# Patient Record
Sex: FEMALE | NOT HISPANIC OR LATINO | Employment: OTHER | URBAN - METROPOLITAN AREA
[De-identification: names, ages, dates, MRNs, and addresses within clinical notes are randomized per-mention and may not be internally consistent; named-entity substitution may affect disease eponyms.]

---

## 2017-02-16 ENCOUNTER — HOSPITAL ENCOUNTER (INPATIENT)
Facility: HOSPITAL | Age: 77
LOS: 22 days | Discharge: NURSING, REHAB OR LONG-TERM CARE | End: 2017-03-10
Attending: SPECIALIST | Admitting: SPECIALIST

## 2017-02-16 ENCOUNTER — HOSPITAL ENCOUNTER (EMERGENCY)
Facility: HOSPITAL | Age: 77
Discharge: PSYCHIATRIC OR SUBSTANCE USE FACILITY | End: 2017-02-16
Attending: EMERGENCY MEDICINE | Admitting: EMERGENCY MEDICINE

## 2017-02-16 VITALS
DIASTOLIC BLOOD PRESSURE: 63 MMHG | RESPIRATION RATE: 16 BRPM | BODY MASS INDEX: 42.68 KG/M2 | HEIGHT: 64 IN | OXYGEN SATURATION: 97 % | TEMPERATURE: 98.6 F | SYSTOLIC BLOOD PRESSURE: 132 MMHG | HEART RATE: 59 BPM | WEIGHT: 250 LBS

## 2017-02-16 DIAGNOSIS — F03.91 DEMENTIA WITH BEHAVIORAL DISTURBANCE, UNSPECIFIED DEMENTIA TYPE: Primary | ICD-10-CM

## 2017-02-16 PROBLEM — F03.918 DEMENTIA WITH BEHAVIORAL DISTURBANCE (HCC): Status: ACTIVE | Noted: 2017-02-16

## 2017-02-16 LAB
AMPHET+METHAMPHET UR QL: NEGATIVE
ANION GAP SERPL CALCULATED.3IONS-SCNC: 10.6 MMOL/L
BARBITURATES UR QL SCN: POSITIVE
BASOPHILS # BLD AUTO: 0.02 10*3/MM3 (ref 0–0.2)
BASOPHILS NFR BLD AUTO: 0.3 % (ref 0–1.5)
BENZODIAZ UR QL SCN: NEGATIVE
BILIRUB UR QL STRIP: NEGATIVE
BUN BLD-MCNC: 12 MG/DL (ref 8–23)
BUN/CREAT SERPL: 10.3 (ref 7–25)
CALCIUM SPEC-SCNC: 8.8 MG/DL (ref 8.6–10.5)
CANNABINOIDS SERPL QL: NEGATIVE
CHLORIDE SERPL-SCNC: 105 MMOL/L (ref 98–107)
CLARITY UR: CLEAR
CO2 SERPL-SCNC: 23.4 MMOL/L (ref 22–29)
COCAINE UR QL: NEGATIVE
COLOR UR: YELLOW
CREAT BLD-MCNC: 1.16 MG/DL (ref 0.57–1)
DEPRECATED RDW RBC AUTO: 59.8 FL (ref 37–54)
EOSINOPHIL # BLD AUTO: 0.21 10*3/MM3 (ref 0–0.7)
EOSINOPHIL NFR BLD AUTO: 3.1 % (ref 0.3–6.2)
ERYTHROCYTE [DISTWIDTH] IN BLOOD BY AUTOMATED COUNT: 17.3 % (ref 11.7–13)
ETHANOL BLD-MCNC: <10 MG/DL (ref 0–10)
ETHANOL UR QL: <0.01 %
GFR SERPL CREATININE-BSD FRML MDRD: 55 ML/MIN/1.73
GLUCOSE BLD-MCNC: 92 MG/DL (ref 65–99)
GLUCOSE UR STRIP-MCNC: NEGATIVE MG/DL
HCT VFR BLD AUTO: 34.9 % (ref 35.6–45.5)
HGB BLD-MCNC: 10.7 G/DL (ref 11.9–15.5)
HGB UR QL STRIP.AUTO: NEGATIVE
IMM GRANULOCYTES # BLD: 0.03 10*3/MM3 (ref 0–0.03)
IMM GRANULOCYTES NFR BLD: 0.4 % (ref 0–0.5)
KETONES UR QL STRIP: NEGATIVE
LEUKOCYTE ESTERASE UR QL STRIP.AUTO: NEGATIVE
LYMPHOCYTES # BLD AUTO: 1.8 10*3/MM3 (ref 0.9–4.8)
LYMPHOCYTES NFR BLD AUTO: 26.9 % (ref 19.6–45.3)
MCH RBC QN AUTO: 29.1 PG (ref 26.9–32)
MCHC RBC AUTO-ENTMCNC: 30.7 G/DL (ref 32.4–36.3)
MCV RBC AUTO: 94.8 FL (ref 80.5–98.2)
METHADONE UR QL SCN: NEGATIVE
MONOCYTES # BLD AUTO: 0.75 10*3/MM3 (ref 0.2–1.2)
MONOCYTES NFR BLD AUTO: 11.2 % (ref 5–12)
NEUTROPHILS # BLD AUTO: 3.88 10*3/MM3 (ref 1.9–8.1)
NEUTROPHILS NFR BLD AUTO: 58.1 % (ref 42.7–76)
NITRITE UR QL STRIP: NEGATIVE
OPIATES UR QL: NEGATIVE
OXYCODONE UR QL SCN: NEGATIVE
PH UR STRIP.AUTO: 6 [PH] (ref 5–8)
PLATELET # BLD AUTO: 226 10*3/MM3 (ref 140–500)
PMV BLD AUTO: 9.7 FL (ref 6–12)
POTASSIUM BLD-SCNC: 4.3 MMOL/L (ref 3.5–5.2)
PROT UR QL STRIP: NEGATIVE
RBC # BLD AUTO: 3.68 10*6/MM3 (ref 3.9–5.2)
SODIUM BLD-SCNC: 139 MMOL/L (ref 136–145)
SP GR UR STRIP: 1.01 (ref 1–1.03)
UROBILINOGEN UR QL STRIP: NORMAL
WBC NRBC COR # BLD: 6.69 10*3/MM3 (ref 4.5–10.7)

## 2017-02-16 PROCEDURE — 25010000002 ZIPRASIDONE MESYLATE PER 10 MG: Performed by: SPECIALIST

## 2017-02-16 PROCEDURE — 25010000002 LORAZEPAM PER 2 MG: Performed by: SPECIALIST

## 2017-02-16 RX ORDER — HYDROXYZINE PAMOATE 25 MG/1
25 CAPSULE ORAL EVERY 6 HOURS PRN
Status: COMPLETED | OUTPATIENT
Start: 2017-02-16 | End: 2017-02-21

## 2017-02-16 RX ORDER — MEMANTINE HYDROCHLORIDE 10 MG/1
10 TABLET ORAL ONCE
Status: DISCONTINUED | OUTPATIENT
Start: 2017-02-16 | End: 2017-02-17

## 2017-02-16 RX ORDER — MEMANTINE HYDROCHLORIDE 10 MG/1
10 TABLET ORAL 2 TIMES DAILY
COMMUNITY

## 2017-02-16 RX ORDER — LORAZEPAM 2 MG/ML
INJECTION INTRAMUSCULAR
Status: DISCONTINUED
Start: 2017-02-16 | End: 2017-02-16 | Stop reason: WASHOUT

## 2017-02-16 RX ORDER — CLONAZEPAM 0.5 MG/1
0.5 TABLET ORAL ONCE
Status: DISCONTINUED | OUTPATIENT
Start: 2017-02-16 | End: 2017-02-18

## 2017-02-16 RX ORDER — ATORVASTATIN CALCIUM 10 MG/1
10 TABLET, FILM COATED ORAL ONCE
Status: DISCONTINUED | OUTPATIENT
Start: 2017-02-16 | End: 2017-02-18

## 2017-02-16 RX ORDER — LORAZEPAM 2 MG/ML
2 INJECTION INTRAMUSCULAR ONCE
Status: COMPLETED | OUTPATIENT
Start: 2017-02-16 | End: 2017-02-16

## 2017-02-16 RX ORDER — PRIMIDONE 250 MG/1
500 TABLET ORAL ONCE
Status: DISCONTINUED | OUTPATIENT
Start: 2017-02-16 | End: 2017-02-17

## 2017-02-16 RX ORDER — PRIMIDONE 250 MG/1
250 TABLET ORAL 3 TIMES DAILY
COMMUNITY
End: 2017-03-10 | Stop reason: HOSPADM

## 2017-02-16 RX ORDER — HYDROXYZINE PAMOATE 25 MG/1
25 CAPSULE ORAL 3 TIMES DAILY PRN
COMMUNITY

## 2017-02-16 RX ORDER — CITALOPRAM 20 MG/1
20 TABLET ORAL DAILY
COMMUNITY

## 2017-02-16 RX ORDER — LORAZEPAM 2 MG/ML
1 INJECTION INTRAMUSCULAR ONCE
Status: COMPLETED | OUTPATIENT
Start: 2017-02-16 | End: 2017-02-16

## 2017-02-16 RX ORDER — ZIPRASIDONE MESYLATE 20 MG/ML
10 INJECTION, POWDER, LYOPHILIZED, FOR SOLUTION INTRAMUSCULAR ONCE
Status: COMPLETED | OUTPATIENT
Start: 2017-02-16 | End: 2017-02-16

## 2017-02-16 RX ORDER — METOPROLOL SUCCINATE 25 MG/1
25 TABLET, EXTENDED RELEASE ORAL DAILY
COMMUNITY

## 2017-02-16 RX ORDER — CLONAZEPAM 0.5 MG/1
0.25 TABLET ORAL 2 TIMES DAILY PRN
COMMUNITY
End: 2017-03-10 | Stop reason: HOSPADM

## 2017-02-16 RX ORDER — DONEPEZIL HYDROCHLORIDE 10 MG/1
10 TABLET, FILM COATED ORAL ONCE
Status: DISCONTINUED | OUTPATIENT
Start: 2017-02-16 | End: 2017-02-18

## 2017-02-16 RX ORDER — DONEPEZIL HYDROCHLORIDE 10 MG/1
10 TABLET, FILM COATED ORAL NIGHTLY
COMMUNITY

## 2017-02-16 RX ADMIN — ZIPRASIDONE MESYLATE 10 MG: 20 INJECTION, POWDER, LYOPHILIZED, FOR SOLUTION INTRAMUSCULAR at 16:33

## 2017-02-16 RX ADMIN — ZIPRASIDONE MESYLATE 10 MG: 20 INJECTION, POWDER, LYOPHILIZED, FOR SOLUTION INTRAMUSCULAR at 21:24

## 2017-02-16 RX ADMIN — LORAZEPAM 1 MG: 2 INJECTION, SOLUTION INTRAMUSCULAR; INTRAVENOUS at 20:04

## 2017-02-16 RX ADMIN — LORAZEPAM 2 MG: 2 INJECTION, SOLUTION INTRAMUSCULAR; INTRAVENOUS at 21:25

## 2017-02-17 PROCEDURE — 25810000003 SODIUM CHLORIDE 0.9 % WITH KCL 20 MEQ 20-0.9 MEQ/L-% SOLUTION: Performed by: HOSPITALIST

## 2017-02-17 PROCEDURE — 25010000002 LORAZEPAM PER 2 MG: Performed by: SPECIALIST

## 2017-02-17 PROCEDURE — 25010000002 ZIPRASIDONE MESYLATE PER 10 MG: Performed by: SPECIALIST

## 2017-02-17 RX ORDER — DONEPEZIL HYDROCHLORIDE 10 MG/1
10 TABLET, FILM COATED ORAL NIGHTLY
Status: DISCONTINUED | OUTPATIENT
Start: 2017-02-17 | End: 2017-03-10 | Stop reason: HOSPADM

## 2017-02-17 RX ORDER — ZIPRASIDONE MESYLATE 20 MG/ML
20 INJECTION, POWDER, LYOPHILIZED, FOR SOLUTION INTRAMUSCULAR ONCE
Status: COMPLETED | OUTPATIENT
Start: 2017-02-17 | End: 2017-02-17

## 2017-02-17 RX ORDER — PANTOPRAZOLE SODIUM 40 MG/1
40 TABLET, DELAYED RELEASE ORAL
Status: DISCONTINUED | OUTPATIENT
Start: 2017-02-18 | End: 2017-03-10 | Stop reason: HOSPADM

## 2017-02-17 RX ORDER — CLONAZEPAM 0.5 MG/1
0.25 TABLET ORAL 2 TIMES DAILY PRN
Status: DISCONTINUED | OUTPATIENT
Start: 2017-02-17 | End: 2017-02-17

## 2017-02-17 RX ORDER — DIVALPROEX SODIUM 125 MG/1
250 CAPSULE, COATED PELLETS ORAL EVERY 6 HOURS SCHEDULED
Status: DISCONTINUED | OUTPATIENT
Start: 2017-02-17 | End: 2017-02-18

## 2017-02-17 RX ORDER — ZIPRASIDONE MESYLATE 20 MG/ML
20 INJECTION, POWDER, LYOPHILIZED, FOR SOLUTION INTRAMUSCULAR EVERY 6 HOURS PRN
Status: DISCONTINUED | OUTPATIENT
Start: 2017-02-17 | End: 2017-03-10 | Stop reason: HOSPADM

## 2017-02-17 RX ORDER — SODIUM CHLORIDE AND POTASSIUM CHLORIDE 150; 900 MG/100ML; MG/100ML
75 INJECTION, SOLUTION INTRAVENOUS CONTINUOUS
Status: DISCONTINUED | OUTPATIENT
Start: 2017-02-17 | End: 2017-02-18

## 2017-02-17 RX ORDER — PRIMIDONE 250 MG/1
250 TABLET ORAL 3 TIMES DAILY
Status: DISCONTINUED | OUTPATIENT
Start: 2017-02-17 | End: 2017-02-18

## 2017-02-17 RX ORDER — MEMANTINE HYDROCHLORIDE 10 MG/1
10 TABLET ORAL 2 TIMES DAILY
Status: DISCONTINUED | OUTPATIENT
Start: 2017-02-17 | End: 2017-03-10 | Stop reason: HOSPADM

## 2017-02-17 RX ORDER — LORAZEPAM 2 MG/ML
2 INJECTION INTRAMUSCULAR ONCE
Status: COMPLETED | OUTPATIENT
Start: 2017-02-17 | End: 2017-02-17

## 2017-02-17 RX ORDER — METOPROLOL SUCCINATE 25 MG/1
25 TABLET, EXTENDED RELEASE ORAL DAILY
Status: DISCONTINUED | OUTPATIENT
Start: 2017-02-17 | End: 2017-02-21

## 2017-02-17 RX ADMIN — ZIPRASIDONE MESYLATE 20 MG: 20 INJECTION, POWDER, LYOPHILIZED, FOR SOLUTION INTRAMUSCULAR at 09:37

## 2017-02-17 RX ADMIN — LORAZEPAM 2 MG: 2 INJECTION, SOLUTION INTRAMUSCULAR; INTRAVENOUS at 09:37

## 2017-02-17 RX ADMIN — ZIPRASIDONE MESYLATE 20 MG: 20 INJECTION, POWDER, LYOPHILIZED, FOR SOLUTION INTRAMUSCULAR at 16:36

## 2017-02-17 RX ADMIN — POTASSIUM CHLORIDE AND SODIUM CHLORIDE 75 ML/HR: 900; 150 INJECTION, SOLUTION INTRAVENOUS at 20:20

## 2017-02-17 RX ADMIN — DIVALPROEX SODIUM 250 MG: 125 CAPSULE, COATED PELLETS ORAL at 19:50

## 2017-02-17 RX ADMIN — PRIMIDONE 250 MG: 250 TABLET ORAL at 20:20

## 2017-02-17 RX ADMIN — MEMANTINE HYDROCHLORIDE 10 MG: 10 TABLET, FILM COATED ORAL at 19:55

## 2017-02-17 RX ADMIN — DONEPEZIL HYDROCHLORIDE 10 MG: 10 TABLET, FILM COATED ORAL at 20:20

## 2017-02-18 LAB
ANION GAP SERPL CALCULATED.3IONS-SCNC: 15.1 MMOL/L
BUN BLD-MCNC: 7 MG/DL (ref 8–23)
BUN/CREAT SERPL: 8.3 (ref 7–25)
CALCIUM SPEC-SCNC: 9.3 MG/DL (ref 8.6–10.5)
CHLORIDE SERPL-SCNC: 102 MMOL/L (ref 98–107)
CHOLEST SERPL-MCNC: 227 MG/DL (ref 0–200)
CO2 SERPL-SCNC: 19.9 MMOL/L (ref 22–29)
CREAT BLD-MCNC: 0.84 MG/DL (ref 0.57–1)
GFR SERPL CREATININE-BSD FRML MDRD: 80 ML/MIN/1.73
GLUCOSE BLD-MCNC: 102 MG/DL (ref 65–99)
HBA1C MFR BLD: 5.31 % (ref 4.8–5.6)
HDLC SERPL-MCNC: 120 MG/DL (ref 40–60)
LDLC SERPL CALC-MCNC: 91 MG/DL (ref 0–100)
LDLC/HDLC SERPL: 0.76 {RATIO}
NT-PROBNP SERPL-MCNC: 528.3 PG/ML (ref 5–900)
POTASSIUM BLD-SCNC: 4.5 MMOL/L (ref 3.5–5.2)
SODIUM BLD-SCNC: 137 MMOL/L (ref 136–145)
TRIGL SERPL-MCNC: 82 MG/DL (ref 0–150)
TSH SERPL DL<=0.05 MIU/L-ACNC: 1.6 MIU/ML (ref 0.27–4.2)
VLDLC SERPL-MCNC: 16.4 MG/DL (ref 5–40)

## 2017-02-18 PROCEDURE — 80048 BASIC METABOLIC PNL TOTAL CA: CPT | Performed by: HOSPITALIST

## 2017-02-18 PROCEDURE — 80061 LIPID PANEL: CPT | Performed by: HOSPITALIST

## 2017-02-18 PROCEDURE — 84443 ASSAY THYROID STIM HORMONE: CPT | Performed by: HOSPITALIST

## 2017-02-18 PROCEDURE — 83880 ASSAY OF NATRIURETIC PEPTIDE: CPT | Performed by: HOSPITALIST

## 2017-02-18 PROCEDURE — 83036 HEMOGLOBIN GLYCOSYLATED A1C: CPT | Performed by: HOSPITALIST

## 2017-02-18 RX ORDER — DIVALPROEX SODIUM 125 MG/1
250 CAPSULE, COATED PELLETS ORAL 4 TIMES DAILY
Status: DISCONTINUED | OUTPATIENT
Start: 2017-02-18 | End: 2017-03-10 | Stop reason: HOSPADM

## 2017-02-18 RX ORDER — PRIMIDONE 250 MG/1
125 TABLET ORAL DAILY
Status: DISCONTINUED | OUTPATIENT
Start: 2017-02-19 | End: 2017-03-10 | Stop reason: HOSPADM

## 2017-02-18 RX ORDER — ATORVASTATIN CALCIUM 40 MG/1
40 TABLET, FILM COATED ORAL NIGHTLY
Status: DISCONTINUED | OUTPATIENT
Start: 2017-02-18 | End: 2017-03-09

## 2017-02-18 RX ORDER — PRIMIDONE 250 MG/1
500 TABLET ORAL NIGHTLY
Status: DISCONTINUED | OUTPATIENT
Start: 2017-02-18 | End: 2017-03-10 | Stop reason: HOSPADM

## 2017-02-18 RX ADMIN — PRIMIDONE 250 MG: 250 TABLET ORAL at 08:24

## 2017-02-18 RX ADMIN — METOPROLOL SUCCINATE 25 MG: 25 TABLET, FILM COATED, EXTENDED RELEASE ORAL at 08:24

## 2017-02-18 RX ADMIN — DIVALPROEX SODIUM 250 MG: 125 CAPSULE, COATED PELLETS ORAL at 02:01

## 2017-02-18 RX ADMIN — DIVALPROEX SODIUM 250 MG: 125 CAPSULE, COATED PELLETS ORAL at 21:30

## 2017-02-18 RX ADMIN — MEMANTINE HYDROCHLORIDE 10 MG: 10 TABLET, FILM COATED ORAL at 17:45

## 2017-02-18 RX ADMIN — DIVALPROEX SODIUM 250 MG: 125 CAPSULE, COATED PELLETS ORAL at 08:24

## 2017-02-18 RX ADMIN — ATORVASTATIN CALCIUM 40 MG: 40 TABLET, FILM COATED ORAL at 21:30

## 2017-02-18 RX ADMIN — MEMANTINE HYDROCHLORIDE 10 MG: 10 TABLET, FILM COATED ORAL at 08:24

## 2017-02-18 RX ADMIN — DIVALPROEX SODIUM 250 MG: 125 CAPSULE, COATED PELLETS ORAL at 17:45

## 2017-02-18 RX ADMIN — HYDROXYZINE PAMOATE 25 MG: 25 CAPSULE ORAL at 08:26

## 2017-02-18 RX ADMIN — PANTOPRAZOLE SODIUM 40 MG: 40 TABLET, DELAYED RELEASE ORAL at 08:24

## 2017-02-18 RX ADMIN — DONEPEZIL HYDROCHLORIDE 10 MG: 10 TABLET, FILM COATED ORAL at 21:30

## 2017-02-18 RX ADMIN — PRIMIDONE 500 MG: 250 TABLET ORAL at 21:31

## 2017-02-19 PROCEDURE — 97110 THERAPEUTIC EXERCISES: CPT

## 2017-02-19 PROCEDURE — 25010000002 ZIPRASIDONE MESYLATE PER 10 MG: Performed by: SPECIALIST

## 2017-02-19 PROCEDURE — 97162 PT EVAL MOD COMPLEX 30 MIN: CPT

## 2017-02-19 RX ORDER — ACETAMINOPHEN 325 MG/1
650 TABLET ORAL EVERY 6 HOURS PRN
Status: DISCONTINUED | OUTPATIENT
Start: 2017-02-19 | End: 2017-03-10 | Stop reason: HOSPADM

## 2017-02-19 RX ORDER — IBUPROFEN 400 MG/1
400 TABLET ORAL EVERY 6 HOURS PRN
Status: DISCONTINUED | OUTPATIENT
Start: 2017-02-19 | End: 2017-03-10 | Stop reason: HOSPADM

## 2017-02-19 RX ADMIN — METOPROLOL SUCCINATE 25 MG: 25 TABLET, FILM COATED, EXTENDED RELEASE ORAL at 09:19

## 2017-02-19 RX ADMIN — MEMANTINE HYDROCHLORIDE 10 MG: 10 TABLET, FILM COATED ORAL at 09:19

## 2017-02-19 RX ADMIN — PRIMIDONE 125 MG: 250 TABLET ORAL at 09:20

## 2017-02-19 RX ADMIN — PANTOPRAZOLE SODIUM 40 MG: 40 TABLET, DELAYED RELEASE ORAL at 09:19

## 2017-02-19 RX ADMIN — DIVALPROEX SODIUM 250 MG: 125 CAPSULE, COATED PELLETS ORAL at 18:51

## 2017-02-19 RX ADMIN — ZIPRASIDONE MESYLATE 20 MG: 20 INJECTION, POWDER, LYOPHILIZED, FOR SOLUTION INTRAMUSCULAR at 15:34

## 2017-02-19 RX ADMIN — DIVALPROEX SODIUM 250 MG: 125 CAPSULE, COATED PELLETS ORAL at 09:19

## 2017-02-19 RX ADMIN — IBUPROFEN 400 MG: 400 TABLET ORAL at 18:51

## 2017-02-19 RX ADMIN — ACETAMINOPHEN 650 MG: 325 TABLET ORAL at 14:47

## 2017-02-20 PROCEDURE — 97110 THERAPEUTIC EXERCISES: CPT

## 2017-02-20 PROCEDURE — 25010000002 ZIPRASIDONE MESYLATE PER 10 MG: Performed by: SPECIALIST

## 2017-02-20 RX ORDER — ZIPRASIDONE HYDROCHLORIDE 20 MG/1
20 CAPSULE ORAL 2 TIMES DAILY WITH MEALS
Status: DISCONTINUED | OUTPATIENT
Start: 2017-02-20 | End: 2017-03-10 | Stop reason: HOSPADM

## 2017-02-20 RX ADMIN — ZIPRASIDONE MESYLATE 20 MG: 20 INJECTION, POWDER, LYOPHILIZED, FOR SOLUTION INTRAMUSCULAR at 10:40

## 2017-02-20 RX ADMIN — ZIPRASIDONE HYDROCHLORIDE 20 MG: 20 CAPSULE ORAL at 12:59

## 2017-02-20 RX ADMIN — IBUPROFEN 400 MG: 400 TABLET ORAL at 18:51

## 2017-02-20 RX ADMIN — MEMANTINE HYDROCHLORIDE 10 MG: 10 TABLET, FILM COATED ORAL at 18:48

## 2017-02-20 RX ADMIN — METOPROLOL SUCCINATE 25 MG: 25 TABLET, FILM COATED, EXTENDED RELEASE ORAL at 09:18

## 2017-02-20 RX ADMIN — ZIPRASIDONE HYDROCHLORIDE 20 MG: 20 CAPSULE ORAL at 18:48

## 2017-02-20 RX ADMIN — PRIMIDONE 125 MG: 250 TABLET ORAL at 09:19

## 2017-02-20 RX ADMIN — DIVALPROEX SODIUM 250 MG: 125 CAPSULE, COATED PELLETS ORAL at 09:18

## 2017-02-20 RX ADMIN — DIVALPROEX SODIUM 250 MG: 125 CAPSULE, COATED PELLETS ORAL at 12:59

## 2017-02-20 RX ADMIN — MEMANTINE HYDROCHLORIDE 10 MG: 10 TABLET, FILM COATED ORAL at 09:18

## 2017-02-20 RX ADMIN — DIVALPROEX SODIUM 250 MG: 125 CAPSULE, COATED PELLETS ORAL at 18:48

## 2017-02-20 RX ADMIN — IBUPROFEN 400 MG: 400 TABLET ORAL at 05:35

## 2017-02-20 RX ADMIN — PANTOPRAZOLE SODIUM 40 MG: 40 TABLET, DELAYED RELEASE ORAL at 09:18

## 2017-02-21 RX ORDER — METOPROLOL SUCCINATE 50 MG/1
50 TABLET, EXTENDED RELEASE ORAL DAILY
Status: DISCONTINUED | OUTPATIENT
Start: 2017-02-22 | End: 2017-03-10 | Stop reason: HOSPADM

## 2017-02-21 RX ADMIN — PRIMIDONE 125 MG: 250 TABLET ORAL at 09:54

## 2017-02-21 RX ADMIN — DONEPEZIL HYDROCHLORIDE 10 MG: 10 TABLET, FILM COATED ORAL at 01:01

## 2017-02-21 RX ADMIN — DIVALPROEX SODIUM 250 MG: 125 CAPSULE, COATED PELLETS ORAL at 09:54

## 2017-02-21 RX ADMIN — ZIPRASIDONE HYDROCHLORIDE 20 MG: 20 CAPSULE ORAL at 09:55

## 2017-02-21 RX ADMIN — MEMANTINE HYDROCHLORIDE 10 MG: 10 TABLET, FILM COATED ORAL at 17:23

## 2017-02-21 RX ADMIN — METOPROLOL SUCCINATE 25 MG: 25 TABLET, FILM COATED, EXTENDED RELEASE ORAL at 09:55

## 2017-02-21 RX ADMIN — MEMANTINE HYDROCHLORIDE 10 MG: 10 TABLET, FILM COATED ORAL at 09:55

## 2017-02-21 RX ADMIN — PANTOPRAZOLE SODIUM 40 MG: 40 TABLET, DELAYED RELEASE ORAL at 09:55

## 2017-02-21 RX ADMIN — DIVALPROEX SODIUM 250 MG: 125 CAPSULE, COATED PELLETS ORAL at 00:59

## 2017-02-21 RX ADMIN — ZIPRASIDONE HYDROCHLORIDE 20 MG: 20 CAPSULE ORAL at 17:23

## 2017-02-21 RX ADMIN — ATORVASTATIN CALCIUM 40 MG: 40 TABLET, FILM COATED ORAL at 01:00

## 2017-02-21 RX ADMIN — DIVALPROEX SODIUM 250 MG: 125 CAPSULE, COATED PELLETS ORAL at 17:23

## 2017-02-21 RX ADMIN — PRIMIDONE 500 MG: 250 TABLET ORAL at 00:59

## 2017-02-21 RX ADMIN — DIVALPROEX SODIUM 250 MG: 125 CAPSULE, COATED PELLETS ORAL at 13:07

## 2017-02-21 RX ADMIN — HYDROXYZINE PAMOATE 25 MG: 25 CAPSULE ORAL at 02:40

## 2017-02-22 PROCEDURE — 25010000002 ZIPRASIDONE MESYLATE PER 10 MG: Performed by: SPECIALIST

## 2017-02-22 RX ORDER — MAGNESIUM HYDROXIDE/ALUMINUM HYDROXICE/SIMETHICONE 120; 1200; 1200 MG/30ML; MG/30ML; MG/30ML
30 SUSPENSION ORAL EVERY 6 HOURS PRN
Status: DISCONTINUED | OUTPATIENT
Start: 2017-02-22 | End: 2017-03-10 | Stop reason: HOSPADM

## 2017-02-22 RX ADMIN — ZIPRASIDONE MESYLATE 20 MG: 20 INJECTION, POWDER, LYOPHILIZED, FOR SOLUTION INTRAMUSCULAR at 00:08

## 2017-02-22 RX ADMIN — DIVALPROEX SODIUM 250 MG: 125 CAPSULE, COATED PELLETS ORAL at 20:32

## 2017-02-22 RX ADMIN — ALUMINUM HYDROXIDE, MAGNESIUM HYDROXIDE, AND SIMETHICONE 30 ML: 200; 200; 20 SUSPENSION ORAL at 17:13

## 2017-02-22 RX ADMIN — DIVALPROEX SODIUM 250 MG: 125 CAPSULE, COATED PELLETS ORAL at 10:50

## 2017-02-22 RX ADMIN — ATORVASTATIN CALCIUM 40 MG: 40 TABLET, FILM COATED ORAL at 20:32

## 2017-02-22 RX ADMIN — PRIMIDONE 125 MG: 250 TABLET ORAL at 10:50

## 2017-02-22 RX ADMIN — ZIPRASIDONE HYDROCHLORIDE 20 MG: 20 CAPSULE ORAL at 10:50

## 2017-02-22 RX ADMIN — PANTOPRAZOLE SODIUM 40 MG: 40 TABLET, DELAYED RELEASE ORAL at 10:50

## 2017-02-22 RX ADMIN — METOPROLOL SUCCINATE 50 MG: 50 TABLET, FILM COATED, EXTENDED RELEASE ORAL at 10:51

## 2017-02-22 RX ADMIN — DONEPEZIL HYDROCHLORIDE 10 MG: 10 TABLET, FILM COATED ORAL at 20:32

## 2017-02-22 RX ADMIN — MEMANTINE HYDROCHLORIDE 10 MG: 10 TABLET, FILM COATED ORAL at 10:49

## 2017-02-22 RX ADMIN — DIVALPROEX SODIUM 250 MG: 125 CAPSULE, COATED PELLETS ORAL at 14:53

## 2017-02-22 RX ADMIN — PRIMIDONE 500 MG: 250 TABLET ORAL at 20:32

## 2017-02-23 PROCEDURE — 25010000002 ZIPRASIDONE MESYLATE PER 10 MG: Performed by: SPECIALIST

## 2017-02-23 PROCEDURE — 97110 THERAPEUTIC EXERCISES: CPT

## 2017-02-23 RX ADMIN — DONEPEZIL HYDROCHLORIDE 10 MG: 10 TABLET, FILM COATED ORAL at 21:12

## 2017-02-23 RX ADMIN — MEMANTINE HYDROCHLORIDE 10 MG: 10 TABLET, FILM COATED ORAL at 21:13

## 2017-02-23 RX ADMIN — PRIMIDONE 125 MG: 250 TABLET ORAL at 15:57

## 2017-02-23 RX ADMIN — ATORVASTATIN CALCIUM 40 MG: 40 TABLET, FILM COATED ORAL at 21:12

## 2017-02-23 RX ADMIN — DIVALPROEX SODIUM 250 MG: 125 CAPSULE, COATED PELLETS ORAL at 21:12

## 2017-02-23 RX ADMIN — METOPROLOL SUCCINATE 50 MG: 50 TABLET, FILM COATED, EXTENDED RELEASE ORAL at 15:57

## 2017-02-23 RX ADMIN — PRIMIDONE 500 MG: 250 TABLET ORAL at 21:13

## 2017-02-23 RX ADMIN — ZIPRASIDONE MESYLATE 20 MG: 20 INJECTION, POWDER, LYOPHILIZED, FOR SOLUTION INTRAMUSCULAR at 16:01

## 2017-02-24 RX ORDER — HYDROCODONE BITARTRATE AND ACETAMINOPHEN 5; 325 MG/1; MG/1
1 TABLET ORAL EVERY 6 HOURS
Status: DISPENSED | OUTPATIENT
Start: 2017-02-24 | End: 2017-03-06

## 2017-02-24 RX ORDER — HYDROCODONE BITARTRATE AND ACETAMINOPHEN 5; 325 MG/1; MG/1
1 TABLET ORAL EVERY 6 HOURS PRN
Status: DISCONTINUED | OUTPATIENT
Start: 2017-02-24 | End: 2017-02-24

## 2017-02-24 RX ADMIN — PRIMIDONE 500 MG: 250 TABLET ORAL at 21:34

## 2017-02-24 RX ADMIN — DIVALPROEX SODIUM 250 MG: 125 CAPSULE, COATED PELLETS ORAL at 09:21

## 2017-02-24 RX ADMIN — DIVALPROEX SODIUM 250 MG: 125 CAPSULE, COATED PELLETS ORAL at 17:48

## 2017-02-24 RX ADMIN — ZIPRASIDONE HYDROCHLORIDE 20 MG: 20 CAPSULE ORAL at 09:23

## 2017-02-24 RX ADMIN — ATORVASTATIN CALCIUM 40 MG: 40 TABLET, FILM COATED ORAL at 21:34

## 2017-02-24 RX ADMIN — PRIMIDONE 125 MG: 250 TABLET ORAL at 09:21

## 2017-02-24 RX ADMIN — MEMANTINE HYDROCHLORIDE 10 MG: 10 TABLET, FILM COATED ORAL at 17:48

## 2017-02-24 RX ADMIN — ZIPRASIDONE HYDROCHLORIDE 20 MG: 20 CAPSULE ORAL at 17:48

## 2017-02-24 RX ADMIN — HYDROCODONE BITARTATE AND ACETAMINOPHEN 1 TABLET: 5; 325 TABLET ORAL at 23:59

## 2017-02-24 RX ADMIN — DIVALPROEX SODIUM 250 MG: 125 CAPSULE, COATED PELLETS ORAL at 12:49

## 2017-02-24 RX ADMIN — MEMANTINE HYDROCHLORIDE 10 MG: 10 TABLET, FILM COATED ORAL at 09:20

## 2017-02-24 RX ADMIN — DIVALPROEX SODIUM 250 MG: 125 CAPSULE, COATED PELLETS ORAL at 21:34

## 2017-02-24 RX ADMIN — DONEPEZIL HYDROCHLORIDE 10 MG: 10 TABLET, FILM COATED ORAL at 21:33

## 2017-02-24 RX ADMIN — HYDROCODONE BITARTATE AND ACETAMINOPHEN 1 TABLET: 5; 325 TABLET ORAL at 17:47

## 2017-02-24 RX ADMIN — METOPROLOL SUCCINATE 50 MG: 50 TABLET, FILM COATED, EXTENDED RELEASE ORAL at 09:20

## 2017-02-25 RX ADMIN — METOPROLOL SUCCINATE 50 MG: 50 TABLET, FILM COATED, EXTENDED RELEASE ORAL at 09:21

## 2017-02-25 RX ADMIN — MEMANTINE HYDROCHLORIDE 10 MG: 10 TABLET, FILM COATED ORAL at 17:35

## 2017-02-25 RX ADMIN — ZIPRASIDONE HYDROCHLORIDE 20 MG: 20 CAPSULE ORAL at 09:20

## 2017-02-25 RX ADMIN — HYDROCODONE BITARTATE AND ACETAMINOPHEN 1 TABLET: 5; 325 TABLET ORAL at 22:25

## 2017-02-25 RX ADMIN — PANTOPRAZOLE SODIUM 40 MG: 40 TABLET, DELAYED RELEASE ORAL at 09:29

## 2017-02-25 RX ADMIN — DIVALPROEX SODIUM 250 MG: 125 CAPSULE, COATED PELLETS ORAL at 17:35

## 2017-02-25 RX ADMIN — DIVALPROEX SODIUM 250 MG: 125 CAPSULE, COATED PELLETS ORAL at 13:56

## 2017-02-25 RX ADMIN — MEMANTINE HYDROCHLORIDE 10 MG: 10 TABLET, FILM COATED ORAL at 09:22

## 2017-02-25 RX ADMIN — DIVALPROEX SODIUM 250 MG: 125 CAPSULE, COATED PELLETS ORAL at 22:24

## 2017-02-25 RX ADMIN — ATORVASTATIN CALCIUM 40 MG: 40 TABLET, FILM COATED ORAL at 22:25

## 2017-02-25 RX ADMIN — PRIMIDONE 125 MG: 250 TABLET ORAL at 09:37

## 2017-02-25 RX ADMIN — DIVALPROEX SODIUM 250 MG: 125 CAPSULE, COATED PELLETS ORAL at 09:22

## 2017-02-25 RX ADMIN — ZIPRASIDONE HYDROCHLORIDE 20 MG: 20 CAPSULE ORAL at 17:34

## 2017-02-25 RX ADMIN — HYDROCODONE BITARTATE AND ACETAMINOPHEN 1 TABLET: 5; 325 TABLET ORAL at 17:35

## 2017-02-25 RX ADMIN — HYDROCODONE BITARTATE AND ACETAMINOPHEN 1 TABLET: 5; 325 TABLET ORAL at 09:20

## 2017-02-26 LAB
ANION GAP SERPL CALCULATED.3IONS-SCNC: 11.1 MMOL/L
BUN BLD-MCNC: 20 MG/DL (ref 8–23)
BUN/CREAT SERPL: 20.8 (ref 7–25)
CALCIUM SPEC-SCNC: 9.1 MG/DL (ref 8.6–10.5)
CHLORIDE SERPL-SCNC: 105 MMOL/L (ref 98–107)
CO2 SERPL-SCNC: 23.9 MMOL/L (ref 22–29)
CREAT BLD-MCNC: 0.96 MG/DL (ref 0.57–1)
GFR SERPL CREATININE-BSD FRML MDRD: 69 ML/MIN/1.73
GLUCOSE BLD-MCNC: 86 MG/DL (ref 65–99)
POTASSIUM BLD-SCNC: 4.1 MMOL/L (ref 3.5–5.2)
SODIUM BLD-SCNC: 140 MMOL/L (ref 136–145)

## 2017-02-26 PROCEDURE — 80048 BASIC METABOLIC PNL TOTAL CA: CPT | Performed by: HOSPITALIST

## 2017-02-26 RX ADMIN — MEMANTINE HYDROCHLORIDE 10 MG: 10 TABLET, FILM COATED ORAL at 22:53

## 2017-02-26 RX ADMIN — METOPROLOL SUCCINATE 50 MG: 50 TABLET, FILM COATED, EXTENDED RELEASE ORAL at 08:46

## 2017-02-26 RX ADMIN — MEMANTINE HYDROCHLORIDE 10 MG: 10 TABLET, FILM COATED ORAL at 08:45

## 2017-02-26 RX ADMIN — DONEPEZIL HYDROCHLORIDE 10 MG: 10 TABLET, FILM COATED ORAL at 22:53

## 2017-02-26 RX ADMIN — ATORVASTATIN CALCIUM 40 MG: 40 TABLET, FILM COATED ORAL at 22:53

## 2017-02-26 RX ADMIN — DIVALPROEX SODIUM 250 MG: 125 CAPSULE, COATED PELLETS ORAL at 22:55

## 2017-02-26 RX ADMIN — ZIPRASIDONE HYDROCHLORIDE 20 MG: 20 CAPSULE ORAL at 18:25

## 2017-02-26 RX ADMIN — PRIMIDONE 125 MG: 250 TABLET ORAL at 08:45

## 2017-02-26 RX ADMIN — PANTOPRAZOLE SODIUM 40 MG: 40 TABLET, DELAYED RELEASE ORAL at 08:45

## 2017-02-26 RX ADMIN — DIVALPROEX SODIUM 250 MG: 125 CAPSULE, COATED PELLETS ORAL at 08:45

## 2017-02-26 RX ADMIN — DIVALPROEX SODIUM 250 MG: 125 CAPSULE, COATED PELLETS ORAL at 18:24

## 2017-02-26 RX ADMIN — ZIPRASIDONE HYDROCHLORIDE 20 MG: 20 CAPSULE ORAL at 08:46

## 2017-02-26 RX ADMIN — HYDROCODONE BITARTATE AND ACETAMINOPHEN 1 TABLET: 5; 325 TABLET ORAL at 04:11

## 2017-02-26 RX ADMIN — HYDROCODONE BITARTATE AND ACETAMINOPHEN 1 TABLET: 5; 325 TABLET ORAL at 18:25

## 2017-02-26 RX ADMIN — HYDROCODONE BITARTATE AND ACETAMINOPHEN 1 TABLET: 5; 325 TABLET ORAL at 23:06

## 2017-02-26 RX ADMIN — PRIMIDONE 500 MG: 250 TABLET ORAL at 22:54

## 2017-02-27 PROCEDURE — 97110 THERAPEUTIC EXERCISES: CPT

## 2017-02-27 RX ADMIN — DIVALPROEX SODIUM 250 MG: 125 CAPSULE, COATED PELLETS ORAL at 11:18

## 2017-02-27 RX ADMIN — ZIPRASIDONE HYDROCHLORIDE 20 MG: 20 CAPSULE ORAL at 08:32

## 2017-02-27 RX ADMIN — DONEPEZIL HYDROCHLORIDE 10 MG: 10 TABLET, FILM COATED ORAL at 22:35

## 2017-02-27 RX ADMIN — DIVALPROEX SODIUM 250 MG: 125 CAPSULE, COATED PELLETS ORAL at 08:32

## 2017-02-27 RX ADMIN — PRIMIDONE 125 MG: 250 TABLET ORAL at 08:32

## 2017-02-27 RX ADMIN — PRIMIDONE 500 MG: 250 TABLET ORAL at 22:33

## 2017-02-27 RX ADMIN — ZIPRASIDONE HYDROCHLORIDE 20 MG: 20 CAPSULE ORAL at 17:16

## 2017-02-27 RX ADMIN — HYDROCODONE BITARTATE AND ACETAMINOPHEN 1 TABLET: 5; 325 TABLET ORAL at 22:34

## 2017-02-27 RX ADMIN — ATORVASTATIN CALCIUM 40 MG: 40 TABLET, FILM COATED ORAL at 22:33

## 2017-02-27 RX ADMIN — PANTOPRAZOLE SODIUM 40 MG: 40 TABLET, DELAYED RELEASE ORAL at 08:34

## 2017-02-27 RX ADMIN — HYDROCODONE BITARTATE AND ACETAMINOPHEN 1 TABLET: 5; 325 TABLET ORAL at 04:40

## 2017-02-27 RX ADMIN — MEMANTINE HYDROCHLORIDE 10 MG: 10 TABLET, FILM COATED ORAL at 08:32

## 2017-02-27 RX ADMIN — DIVALPROEX SODIUM 250 MG: 125 CAPSULE, COATED PELLETS ORAL at 17:16

## 2017-02-27 RX ADMIN — METOPROLOL SUCCINATE 50 MG: 50 TABLET, FILM COATED, EXTENDED RELEASE ORAL at 08:33

## 2017-02-27 RX ADMIN — HYDROCODONE BITARTATE AND ACETAMINOPHEN 1 TABLET: 5; 325 TABLET ORAL at 16:16

## 2017-02-27 RX ADMIN — DIVALPROEX SODIUM 250 MG: 125 CAPSULE, COATED PELLETS ORAL at 22:33

## 2017-02-27 RX ADMIN — MEMANTINE HYDROCHLORIDE 10 MG: 10 TABLET, FILM COATED ORAL at 17:16

## 2017-02-27 RX ADMIN — HYDROCODONE BITARTATE AND ACETAMINOPHEN 1 TABLET: 5; 325 TABLET ORAL at 10:17

## 2017-02-28 LAB — VIT B12 BLD-MCNC: 561 PG/ML (ref 211–946)

## 2017-02-28 PROCEDURE — 82607 VITAMIN B-12: CPT | Performed by: SPECIALIST

## 2017-02-28 RX ADMIN — PANTOPRAZOLE SODIUM 40 MG: 40 TABLET, DELAYED RELEASE ORAL at 10:08

## 2017-02-28 RX ADMIN — MEMANTINE HYDROCHLORIDE 10 MG: 10 TABLET, FILM COATED ORAL at 17:38

## 2017-02-28 RX ADMIN — HYDROCODONE BITARTATE AND ACETAMINOPHEN 1 TABLET: 5; 325 TABLET ORAL at 23:11

## 2017-02-28 RX ADMIN — PRIMIDONE 125 MG: 250 TABLET ORAL at 10:08

## 2017-02-28 RX ADMIN — DIVALPROEX SODIUM 250 MG: 125 CAPSULE, COATED PELLETS ORAL at 10:08

## 2017-02-28 RX ADMIN — HYDROCODONE BITARTATE AND ACETAMINOPHEN 1 TABLET: 5; 325 TABLET ORAL at 17:38

## 2017-02-28 RX ADMIN — ATORVASTATIN CALCIUM 40 MG: 40 TABLET, FILM COATED ORAL at 23:10

## 2017-02-28 RX ADMIN — DIVALPROEX SODIUM 250 MG: 125 CAPSULE, COATED PELLETS ORAL at 23:10

## 2017-02-28 RX ADMIN — ZIPRASIDONE HYDROCHLORIDE 20 MG: 20 CAPSULE ORAL at 17:38

## 2017-02-28 RX ADMIN — DONEPEZIL HYDROCHLORIDE 10 MG: 10 TABLET, FILM COATED ORAL at 23:09

## 2017-02-28 RX ADMIN — HYDROCODONE BITARTATE AND ACETAMINOPHEN 1 TABLET: 5; 325 TABLET ORAL at 10:10

## 2017-02-28 RX ADMIN — PRIMIDONE 500 MG: 250 TABLET ORAL at 23:11

## 2017-02-28 RX ADMIN — MEMANTINE HYDROCHLORIDE 10 MG: 10 TABLET, FILM COATED ORAL at 10:11

## 2017-02-28 RX ADMIN — DEXTROMETHORPHAN HYDROBROMIDE AND QUINIDINE SULFATE 1 CAPSULE: 20; 10 CAPSULE, GELATIN COATED ORAL at 10:11

## 2017-02-28 RX ADMIN — DIVALPROEX SODIUM 250 MG: 125 CAPSULE, COATED PELLETS ORAL at 17:38

## 2017-02-28 RX ADMIN — METOPROLOL SUCCINATE 50 MG: 50 TABLET, FILM COATED, EXTENDED RELEASE ORAL at 10:09

## 2017-02-28 RX ADMIN — ZIPRASIDONE HYDROCHLORIDE 20 MG: 20 CAPSULE ORAL at 10:10

## 2017-03-01 LAB
ALBUMIN SERPL-MCNC: 3.2 G/DL (ref 3.5–5.2)
ALBUMIN/GLOB SERPL: 0.8 G/DL
ALP SERPL-CCNC: 87 U/L (ref 39–117)
ALT SERPL W P-5'-P-CCNC: 8 U/L (ref 1–33)
ANION GAP SERPL CALCULATED.3IONS-SCNC: 13.6 MMOL/L
AST SERPL-CCNC: 15 U/L (ref 1–32)
BASOPHILS # BLD AUTO: 0.04 10*3/MM3 (ref 0–0.2)
BASOPHILS NFR BLD AUTO: 0.6 % (ref 0–1.5)
BILIRUB SERPL-MCNC: 0.2 MG/DL (ref 0.1–1.2)
BUN BLD-MCNC: 13 MG/DL (ref 8–23)
BUN/CREAT SERPL: 15.3 (ref 7–25)
CALCIUM SPEC-SCNC: 9.8 MG/DL (ref 8.6–10.5)
CHLORIDE SERPL-SCNC: 103 MMOL/L (ref 98–107)
CO2 SERPL-SCNC: 24.4 MMOL/L (ref 22–29)
CREAT BLD-MCNC: 0.85 MG/DL (ref 0.57–1)
DEPRECATED RDW RBC AUTO: 57.8 FL (ref 37–54)
EOSINOPHIL # BLD AUTO: 0.16 10*3/MM3 (ref 0–0.7)
EOSINOPHIL NFR BLD AUTO: 2.2 % (ref 0.3–6.2)
ERYTHROCYTE [DISTWIDTH] IN BLOOD BY AUTOMATED COUNT: 16.9 % (ref 11.7–13)
GFR SERPL CREATININE-BSD FRML MDRD: 79 ML/MIN/1.73
GLOBULIN UR ELPH-MCNC: 4.1 GM/DL
GLUCOSE BLD-MCNC: 87 MG/DL (ref 65–99)
HCT VFR BLD AUTO: 39.7 % (ref 35.6–45.5)
HGB BLD-MCNC: 12.6 G/DL (ref 11.9–15.5)
IMM GRANULOCYTES # BLD: 0.07 10*3/MM3 (ref 0–0.03)
IMM GRANULOCYTES NFR BLD: 1 % (ref 0–0.5)
LYMPHOCYTES # BLD AUTO: 2.3 10*3/MM3 (ref 0.9–4.8)
LYMPHOCYTES NFR BLD AUTO: 31.6 % (ref 19.6–45.3)
MCH RBC QN AUTO: 29.5 PG (ref 26.9–32)
MCHC RBC AUTO-ENTMCNC: 31.7 G/DL (ref 32.4–36.3)
MCV RBC AUTO: 93 FL (ref 80.5–98.2)
MONOCYTES # BLD AUTO: 1.16 10*3/MM3 (ref 0.2–1.2)
MONOCYTES NFR BLD AUTO: 16 % (ref 5–12)
NEUTROPHILS # BLD AUTO: 3.54 10*3/MM3 (ref 1.9–8.1)
NEUTROPHILS NFR BLD AUTO: 48.6 % (ref 42.7–76)
PLATELET # BLD AUTO: 237 10*3/MM3 (ref 140–500)
PMV BLD AUTO: 10.1 FL (ref 6–12)
POTASSIUM BLD-SCNC: 4.5 MMOL/L (ref 3.5–5.2)
PROT SERPL-MCNC: 7.3 G/DL (ref 6–8.5)
RBC # BLD AUTO: 4.27 10*6/MM3 (ref 3.9–5.2)
SODIUM BLD-SCNC: 141 MMOL/L (ref 136–145)
WBC NRBC COR # BLD: 7.27 10*3/MM3 (ref 4.5–10.7)

## 2017-03-01 PROCEDURE — 85025 COMPLETE CBC W/AUTO DIFF WBC: CPT | Performed by: HOSPITALIST

## 2017-03-01 PROCEDURE — 80053 COMPREHEN METABOLIC PANEL: CPT | Performed by: HOSPITALIST

## 2017-03-01 RX ADMIN — HYDROCODONE BITARTATE AND ACETAMINOPHEN 1 TABLET: 5; 325 TABLET ORAL at 12:56

## 2017-03-01 RX ADMIN — PRIMIDONE 500 MG: 250 TABLET ORAL at 20:47

## 2017-03-01 RX ADMIN — DIVALPROEX SODIUM 250 MG: 125 CAPSULE, COATED PELLETS ORAL at 08:31

## 2017-03-01 RX ADMIN — ATORVASTATIN CALCIUM 40 MG: 40 TABLET, FILM COATED ORAL at 20:48

## 2017-03-01 RX ADMIN — MEMANTINE HYDROCHLORIDE 10 MG: 10 TABLET, FILM COATED ORAL at 08:31

## 2017-03-01 RX ADMIN — MEMANTINE HYDROCHLORIDE 10 MG: 10 TABLET, FILM COATED ORAL at 17:00

## 2017-03-01 RX ADMIN — METOPROLOL SUCCINATE 50 MG: 50 TABLET, FILM COATED, EXTENDED RELEASE ORAL at 08:30

## 2017-03-01 RX ADMIN — DIVALPROEX SODIUM 250 MG: 125 CAPSULE, COATED PELLETS ORAL at 17:00

## 2017-03-01 RX ADMIN — ZIPRASIDONE HYDROCHLORIDE 20 MG: 20 CAPSULE ORAL at 08:31

## 2017-03-01 RX ADMIN — DIVALPROEX SODIUM 250 MG: 125 CAPSULE, COATED PELLETS ORAL at 20:47

## 2017-03-01 RX ADMIN — ZIPRASIDONE HYDROCHLORIDE 20 MG: 20 CAPSULE ORAL at 17:01

## 2017-03-01 RX ADMIN — PRIMIDONE 125 MG: 250 TABLET ORAL at 08:31

## 2017-03-01 RX ADMIN — DONEPEZIL HYDROCHLORIDE 10 MG: 10 TABLET, FILM COATED ORAL at 20:48

## 2017-03-01 RX ADMIN — HYDROCODONE BITARTATE AND ACETAMINOPHEN 1 TABLET: 5; 325 TABLET ORAL at 17:01

## 2017-03-01 RX ADMIN — HYDROCODONE BITARTATE AND ACETAMINOPHEN 1 TABLET: 5; 325 TABLET ORAL at 23:36

## 2017-03-01 RX ADMIN — PANTOPRAZOLE SODIUM 40 MG: 40 TABLET, DELAYED RELEASE ORAL at 08:31

## 2017-03-01 RX ADMIN — DEXTROMETHORPHAN HYDROBROMIDE AND QUINIDINE SULFATE 1 CAPSULE: 20; 10 CAPSULE, GELATIN COATED ORAL at 08:31

## 2017-03-01 RX ADMIN — DIVALPROEX SODIUM 250 MG: 125 CAPSULE, COATED PELLETS ORAL at 12:56

## 2017-03-01 NOTE — SIGNIFICANT NOTE
03/01/17 1544   Rehab Treatment   Discipline physical therapy assistant   Treatment Not Performed patient/family declined treatment  (Pnt initially agreed to PT. When attempted to do LE ex, pnt did not participate, closed her eyes. Refused to attempt to sit up on EOB.)

## 2017-03-01 NOTE — PLAN OF CARE
Problem:  Patient Care Overview (Adult)  Goal: Plan of Care Review  Outcome: Ongoing (interventions implemented as appropriate)    02/19/17 1103 02/28/17 2105 03/01/17 0707   Coping/Psychosocial Response Interventions   Plan Of Care Reviewed With --  durable power of ;family --    Coping/Psychosocial   Patient Agreement with Plan of Care --  agrees --    Patient Care Overview   Consent Given to Review Plan with Pt presents w increased generalized weakness as well as impaired cognition limiting pts safety and independence w functional mobility. Due to the above pt will benifit from skilled PT. --  --    Progress --  --  improving   Outcome Evaluation   Outcome Summary/Follow up Plan --  --  Unable to assess anxiety and depression r/t confusion. Refused scheduled Norco at 0415. Pt up in chair in dayroom at 0400. Tolerated well. Continue to monitor and assess.        Goal: Interdisciplinary Rounds/Family Conference  Outcome: Ongoing (interventions implemented as appropriate)  Goal: Individualization and Mutuality  Outcome: Ongoing (interventions implemented as appropriate)  Goal: Discharge Needs Assessment  Outcome: Ongoing (interventions implemented as appropriate)    Problem:  Overarching Goals  Goal: Adheres to Safety Considerations for Self and Others  Outcome: Ongoing (interventions implemented as appropriate)  Intervention: Develop/maintain Individualized Safety Plan    02/27/17 0930 03/01/17 0230   Safety   Safety Measures --  safety rounds completed;suicide assessment completed   Mental Health Homicide Risk   Homicidal Ideation --  no   Willingness to Contact Staff Member if Feeling Like Hurting Others --  other (see comments)  (unable to assess)   Provide Emotional/Physical Safety   Suicidal Ideation --  no   Suicide Plan/Availability unable to assess --    Willingness to Contact Staff Member if Feeling Like Hurting Self --  other (see comments)  (unable to assess)         Goal: Optimized Coping  Skills in Response to Life Stressors  Outcome: Ongoing (interventions implemented as appropriate)  Intervention: Promote Effective Coping Strategies    03/01/17 0230   Coping Strategies   Supportive Measures active listening utilized;self-reflection promoted;self-responsibility promoted;verbalization of feelings encouraged         Goal: Develops/Participates in Therapeutic [place] to Support Successful Transition  Outcome: Ongoing (interventions implemented as appropriate)  Intervention: Foster Therapeutic [place]    03/01/17 0230   Coping Strategies   Trust Relationship/Rapport care explained;choices provided;emotional support provided;empathic listening provided;questions answered;questions encouraged;reassurance provided;thoughts/feelings acknowledged       Intervention: Mutually Develop Transition Plan    02/28/17 1800   OTHER   Transition Support crisis management plan promoted

## 2017-03-01 NOTE — PLAN OF CARE
Problem: [facility] Patient Care Overview (Adult)  Goal: Plan of Care Review  Outcome: Ongoing (interventions implemented as appropriate)    02/28/17 2105   Coping/Psychosocial Response Interventions   Plan Of Care Reviewed With durable power of ;family   Coping/Psychosocial   Patient Agreement with Plan of Care agrees   Patient Care Overview   Progress improving   Outcome Evaluation   Outcome Summary/Follow up Plan PT with less agitation this shift than previous. Accepted all meds. Allowed bed bath. Less verbal abuse to staff also. Over all more cooperative. Will con't to monitor.        Goal: Interdisciplinary Rounds/Family Conference  Outcome: Ongoing (interventions implemented as appropriate)  Goal: Individualization and Mutuality  Outcome: Ongoing (interventions implemented as appropriate)  Goal: Discharge Needs Assessment  Outcome: Ongoing (interventions implemented as appropriate)    Problem:  Overarching Goals  Goal: Adheres to Safety Considerations for Self and Others  Outcome: Ongoing (interventions implemented as appropriate)  Goal: Optimized Coping Skills in Response to Life Stressors  Outcome: Ongoing (interventions implemented as appropriate)  Goal: Develops/Participates in Therapeutic [place] to Support Successful Transition  Outcome: Ongoing (interventions implemented as appropriate)

## 2017-03-01 NOTE — PROGRESS NOTES
DAILY PROGRESS NOTE    CHIEF COMPLAINT  DOING BETTER  NO NEW ISSUES      HISTORY OF PRESENT ILLNESS: A 76-year-old  female with a history of dementia, hypertension, admitted through the emergency room to the psychiatric unit with increased agitation and combativeness. The patient is also having auditory and visual hallucinations. I am asked to follow the patient for medical problems and do the history and physical.     PHYSICAL EXAMINATION:Blood pressure 162/79, pulse 59, temperature 97.6 °F (36.4 °C), temperature source Oral, resp. rate 20, weight 252 lb (114 kg), SpO2 98 %.    GENERAL: Alert, combative, in no respiratory distress.   HEENT: Unremarkable.    NECK: Supple.   LUNGS: Decreased breath sounds.   HEART: S1 and S2.    ABDOMEN: Soft, nontender. Bowel sounds positive.   EXTREMITIES: No edema.    NEUROLOGIC: Moves all 4 extremities. Gait and balance not checked.     DIAGNOSTIC DATA:   Lab Results (last 24 hours)     Procedure Component Value Units Date/Time    Comprehensive Metabolic Panel [[id number]] Collected:  03/01/17 1153    Specimen:  Blood Updated:  03/01/17 1234    CBC & Differential [[id number]] Collected:  03/01/17 1153    Specimen:  Blood Updated:  03/01/17 1240    Narrative:       The following orders were created for panel order CBC & Differential.  Procedure                               Abnormality         Status                     ---------                               -----------         ------                     CBC Auto Differential[[id number]]         Abnormal            Final result                 Please view results for these tests on the individual orders.    CBC Auto Differential [[id number]]  (Abnormal) Collected:  03/01/17 1153    Specimen:  Blood Updated:  03/01/17 1240     WBC 7.27 10*3/mm3      RBC 4.27 10*6/mm3      Hemoglobin 12.6 g/dL      Hematocrit 39.7 %      MCV 93.0 fL      MCH 29.5 pg      MCHC 31.7 (L) g/dL      RDW 16.9 (H) %      RDW-SD 57.8 (H) fl       MPV 10.1 fL      Platelets 237 10*3/mm3      Neutrophil % 48.6 %      Lymphocyte % 31.6 %      Monocyte % 16.0 (H) %      Eosinophil % 2.2 %      Basophil % 0.6 %      Immature Grans % 1.0 (H) %      Neutrophils, Absolute 3.54 10*3/mm3      Lymphocytes, Absolute 2.30 10*3/mm3      Monocytes, Absolute 1.16 10*3/mm3      Eosinophils, Absolute 0.16 10*3/mm3      Basophils, Absolute 0.04 10*3/mm3      Immature Grans, Absolute 0.07 (H) 10*3/mm3         Lab Results (last 24 hours)     Procedure Component Value Units Date/Time    Comprehensive Metabolic Panel [[id number]] Collected:  03/01/17 1153    Specimen:  Blood Updated:  03/01/17 1234    CBC & Differential [[id number]] Collected:  03/01/17 1153    Specimen:  Blood Updated:  03/01/17 1240    Narrative:       The following orders were created for panel order CBC & Differential.  Procedure                               Abnormality         Status                     ---------                               -----------         ------                     CBC Auto Differential[[id number]]         Abnormal            Final result                 Please view results for these tests on the individual orders.    CBC Auto Differential [[id number]]  (Abnormal) Collected:  03/01/17 1153    Specimen:  Blood Updated:  03/01/17 1240     WBC 7.27 10*3/mm3      RBC 4.27 10*6/mm3      Hemoglobin 12.6 g/dL      Hematocrit 39.7 %      MCV 93.0 fL      MCH 29.5 pg      MCHC 31.7 (L) g/dL      RDW 16.9 (H) %      RDW-SD 57.8 (H) fl      MPV 10.1 fL      Platelets 237 10*3/mm3      Neutrophil % 48.6 %      Lymphocyte % 31.6 %      Monocyte % 16.0 (H) %      Eosinophil % 2.2 %      Basophil % 0.6 %      Immature Grans % 1.0 (H) %      Neutrophils, Absolute 3.54 10*3/mm3      Lymphocytes, Absolute 2.30 10*3/mm3      Monocytes, Absolute 1.16 10*3/mm3      Eosinophils, Absolute 0.16 10*3/mm3      Basophils, Absolute 0.04 10*3/mm3      Immature Grans, Absolute 0.07 (H) 10*3/mm3         Lab Results (last  24 hours)     Procedure Component Value Units Date/Time    Comprehensive Metabolic Panel [[id number]] Collected:  03/01/17 1153    Specimen:  Blood Updated:  03/01/17 1234    CBC & Differential [[id number]] Collected:  03/01/17 1153    Specimen:  Blood Updated:  03/01/17 1240    Narrative:       The following orders were created for panel order CBC & Differential.  Procedure                               Abnormality         Status                     ---------                               -----------         ------                     CBC Auto Differential[[id number]]         Abnormal            Final result                 Please view results for these tests on the individual orders.    CBC Auto Differential [[id number]]  (Abnormal) Collected:  03/01/17 1153    Specimen:  Blood Updated:  03/01/17 1240     WBC 7.27 10*3/mm3      RBC 4.27 10*6/mm3      Hemoglobin 12.6 g/dL      Hematocrit 39.7 %      MCV 93.0 fL      MCH 29.5 pg      MCHC 31.7 (L) g/dL      RDW 16.9 (H) %      RDW-SD 57.8 (H) fl      MPV 10.1 fL      Platelets 237 10*3/mm3      Neutrophil % 48.6 %      Lymphocyte % 31.6 %      Monocyte % 16.0 (H) %      Eosinophil % 2.2 %      Basophil % 0.6 %      Immature Grans % 1.0 (H) %      Neutrophils, Absolute 3.54 10*3/mm3      Lymphocytes, Absolute 2.30 10*3/mm3      Monocytes, Absolute 1.16 10*3/mm3      Eosinophils, Absolute 0.16 10*3/mm3      Basophils, Absolute 0.04 10*3/mm3      Immature Grans, Absolute 0.07 (H) 10*3/mm3         Lab Results (last 24 hours)     Procedure Component Value Units Date/Time    Comprehensive Metabolic Panel [[id number]] Collected:  03/01/17 1153    Specimen:  Blood Updated:  03/01/17 1234    CBC & Differential [[id number]] Collected:  03/01/17 1153    Specimen:  Blood Updated:  03/01/17 1240    Narrative:       The following orders were created for panel order CBC & Differential.  Procedure                               Abnormality         Status                     ---------                                -----------         ------                     CBC Auto Differential[[id number]]         Abnormal            Final result                 Please view results for these tests on the individual orders.    CBC Auto Differential [[id number]]  (Abnormal) Collected:  03/01/17 1153    Specimen:  Blood Updated:  03/01/17 1240     WBC 7.27 10*3/mm3      RBC 4.27 10*6/mm3      Hemoglobin 12.6 g/dL      Hematocrit 39.7 %      MCV 93.0 fL      MCH 29.5 pg      MCHC 31.7 (L) g/dL      RDW 16.9 (H) %      RDW-SD 57.8 (H) fl      MPV 10.1 fL      Platelets 237 10*3/mm3      Neutrophil % 48.6 %      Lymphocyte % 31.6 %      Monocyte % 16.0 (H) %      Eosinophil % 2.2 %      Basophil % 0.6 %      Immature Grans % 1.0 (H) %      Neutrophils, Absolute 3.54 10*3/mm3      Lymphocytes, Absolute 2.30 10*3/mm3      Monocytes, Absolute 1.16 10*3/mm3      Eosinophils, Absolute 0.16 10*3/mm3      Basophils, Absolute 0.04 10*3/mm3      Immature Grans, Absolute 0.07 (H) 10*3/mm3         Lab Results (last 24 hours)     Procedure Component Value Units Date/Time    Comprehensive Metabolic Panel [[id number]] Collected:  03/01/17 1153    Specimen:  Blood Updated:  03/01/17 1234    CBC & Differential [[id number]] Collected:  03/01/17 1153    Specimen:  Blood Updated:  03/01/17 1240    Narrative:       The following orders were created for panel order CBC & Differential.  Procedure                               Abnormality         Status                     ---------                               -----------         ------                     CBC Auto Differential[[id number]]         Abnormal            Final result                 Please view results for these tests on the individual orders.    CBC Auto Differential [[id number]]  (Abnormal) Collected:  03/01/17 1153    Specimen:  Blood Updated:  03/01/17 1240     WBC 7.27 10*3/mm3      RBC 4.27 10*6/mm3      Hemoglobin 12.6 g/dL      Hematocrit 39.7 %      MCV 93.0 fL      MCH 29.5 pg       MCHC 31.7 (L) g/dL      RDW 16.9 (H) %      RDW-SD 57.8 (H) fl      MPV 10.1 fL      Platelets 237 10*3/mm3      Neutrophil % 48.6 %      Lymphocyte % 31.6 %      Monocyte % 16.0 (H) %      Eosinophil % 2.2 %      Basophil % 0.6 %      Immature Grans % 1.0 (H) %      Neutrophils, Absolute 3.54 10*3/mm3      Lymphocytes, Absolute 2.30 10*3/mm3      Monocytes, Absolute 1.16 10*3/mm3      Eosinophils, Absolute 0.16 10*3/mm3      Basophils, Absolute 0.04 10*3/mm3      Immature Grans, Absolute 0.07 (H) 10*3/mm3         Lab Results (last 24 hours)     Procedure Component Value Units Date/Time    Comprehensive Metabolic Panel [[id number]] Collected:  03/01/17 1153    Specimen:  Blood Updated:  03/01/17 1234    CBC & Differential [[id number]] Collected:  03/01/17 1153    Specimen:  Blood Updated:  03/01/17 1240    Narrative:       The following orders were created for panel order CBC & Differential.  Procedure                               Abnormality         Status                     ---------                               -----------         ------                     CBC Auto Differential[[id number]]         Abnormal            Final result                 Please view results for these tests on the individual orders.    CBC Auto Differential [[id number]]  (Abnormal) Collected:  03/01/17 1153    Specimen:  Blood Updated:  03/01/17 1240     WBC 7.27 10*3/mm3      RBC 4.27 10*6/mm3      Hemoglobin 12.6 g/dL      Hematocrit 39.7 %      MCV 93.0 fL      MCH 29.5 pg      MCHC 31.7 (L) g/dL      RDW 16.9 (H) %      RDW-SD 57.8 (H) fl      MPV 10.1 fL      Platelets 237 10*3/mm3      Neutrophil % 48.6 %      Lymphocyte % 31.6 %      Monocyte % 16.0 (H) %      Eosinophil % 2.2 %      Basophil % 0.6 %      Immature Grans % 1.0 (H) %      Neutrophils, Absolute 3.54 10*3/mm3      Lymphocytes, Absolute 2.30 10*3/mm3      Monocytes, Absolute 1.16 10*3/mm3      Eosinophils, Absolute 0.16 10*3/mm3      Basophils, Absolute 0.04 10*3/mm3       Immature Grans, Absolute 0.07 (H) 10*3/mm3         Lab Results (last 24 hours)     Procedure Component Value Units Date/Time    Comprehensive Metabolic Panel [[id number]] Collected:  03/01/17 1153    Specimen:  Blood Updated:  03/01/17 1234    CBC & Differential [[id number]] Collected:  03/01/17 1153    Specimen:  Blood Updated:  03/01/17 1240    Narrative:       The following orders were created for panel order CBC & Differential.  Procedure                               Abnormality         Status                     ---------                               -----------         ------                     CBC Auto Differential[[id number]]         Abnormal            Final result                 Please view results for these tests on the individual orders.    CBC Auto Differential [[id number]]  (Abnormal) Collected:  03/01/17 1153    Specimen:  Blood Updated:  03/01/17 1240     WBC 7.27 10*3/mm3      RBC 4.27 10*6/mm3      Hemoglobin 12.6 g/dL      Hematocrit 39.7 %      MCV 93.0 fL      MCH 29.5 pg      MCHC 31.7 (L) g/dL      RDW 16.9 (H) %      RDW-SD 57.8 (H) fl      MPV 10.1 fL      Platelets 237 10*3/mm3      Neutrophil % 48.6 %      Lymphocyte % 31.6 %      Monocyte % 16.0 (H) %      Eosinophil % 2.2 %      Basophil % 0.6 %      Immature Grans % 1.0 (H) %      Neutrophils, Absolute 3.54 10*3/mm3      Lymphocytes, Absolute 2.30 10*3/mm3      Monocytes, Absolute 1.16 10*3/mm3      Eosinophils, Absolute 0.16 10*3/mm3      Basophils, Absolute 0.04 10*3/mm3      Immature Grans, Absolute 0.07 (H) 10*3/mm3         Lab Results (last 24 hours)     Procedure Component Value Units Date/Time    Comprehensive Metabolic Panel [[id number]] Collected:  03/01/17 1153    Specimen:  Blood Updated:  03/01/17 1234    CBC & Differential [[id number]] Collected:  03/01/17 1153    Specimen:  Blood Updated:  03/01/17 1240    Narrative:       The following orders were created for panel order CBC & Differential.  Procedure                                Abnormality         Status                     ---------                               -----------         ------                     CBC Auto Differential[[id number]]         Abnormal            Final result                 Please view results for these tests on the individual orders.    CBC Auto Differential [[id number]]  (Abnormal) Collected:  03/01/17 1153    Specimen:  Blood Updated:  03/01/17 1240     WBC 7.27 10*3/mm3      RBC 4.27 10*6/mm3      Hemoglobin 12.6 g/dL      Hematocrit 39.7 %      MCV 93.0 fL      MCH 29.5 pg      MCHC 31.7 (L) g/dL      RDW 16.9 (H) %      RDW-SD 57.8 (H) fl      MPV 10.1 fL      Platelets 237 10*3/mm3      Neutrophil % 48.6 %      Lymphocyte % 31.6 %      Monocyte % 16.0 (H) %      Eosinophil % 2.2 %      Basophil % 0.6 %      Immature Grans % 1.0 (H) %      Neutrophils, Absolute 3.54 10*3/mm3      Lymphocytes, Absolute 2.30 10*3/mm3      Monocytes, Absolute 1.16 10*3/mm3      Eosinophils, Absolute 0.16 10*3/mm3      Basophils, Absolute 0.04 10*3/mm3      Immature Grans, Absolute 0.07 (H) 10*3/mm3       Labs showed a potassium of 4.3, BUN 12, creatinine 1.1. White count 6.6, hemoglobin 10.7, platelets 226,000. UA is negative. Toxicology screen is positive for barbiturates.    Imaging Results (last 72 hours)     ** No results found for the last 72 hours. **        Current Facility-Administered Medications:   •  acetaminophen (TYLENOL) tablet 650 mg, 650 mg, Oral, Q6H PRN, Rene Marie MD, 650 mg at 02/19/17 1447  •  aluminum-magnesium hydroxide-simethicone (MAALOX/MYLANTA) suspension 30 mL, 30 mL, Oral, Q6H PRN, Riccardo Mckay III, MD, 30 mL at 02/22/17 1713  •  atorvastatin (LIPITOR) tablet 40 mg, 40 mg, Oral, Nightly, Grabiel Westfall MD, 40 mg at 02/28/17 2310  •  dextromethorphan-quinidine (NUEDEXTA) capsule 1 capsule, 1 capsule, Oral, Daily, 1 capsule at 03/01/17 0831 **FOLLOWED BY** [START ON 3/6/2017] dextromethorphan-quinidine (NUEDEXTA) capsule 1 capsule, 1  capsule, Oral, Q12H, Riccardo Mckay III, MD  •  Divalproex Sodium (DEPAKOTE SPRINKLE) capsule 250 mg, 250 mg, Oral, 4x Daily, Rene Marie MD, 250 mg at 03/01/17 1256  •  donepezil (ARICEPT) tablet 10 mg, 10 mg, Oral, Nightly, Riccardo Mckay III, MD, 10 mg at 02/28/17 2309  •  HYDROcodone-acetaminophen (NORCO) 5-325 MG per tablet 1 tablet, 1 tablet, Oral, Q6H, Riccardo Mckay III, MD, 1 tablet at 03/01/17 1256  •  ibuprofen (ADVIL,MOTRIN) tablet 400 mg, 400 mg, Oral, Q6H PRN, Rene Marie MD, 400 mg at 02/20/17 1851  •  memantine (NAMENDA) tablet 10 mg, 10 mg, Oral, BID, Riccardo Mckay III, MD, 10 mg at 03/01/17 0831  •  metoprolol succinate XL (TOPROL-XL) 24 hr tablet 50 mg, 50 mg, Oral, Daily, Grabiel Westfall MD, 50 mg at 03/01/17 0830  •  pantoprazole (PROTONIX) EC tablet 40 mg, 40 mg, Oral, Q AM, Grabiel Westfall MD, 40 mg at 03/01/17 0831  •  primidone (MYSOLINE) tablet 125 mg, 125 mg, Oral, Daily, Rene Marie MD, 125 mg at 03/01/17 0831  •  primidone (MYSOLINE) tablet 500 mg, 500 mg, Oral, Nightly, Rene Marie MD, 500 mg at 02/28/17 2311  •  ziprasidone (GEODON) capsule 20 mg, 20 mg, Oral, BID With Meals, Riccardo Mckay III, MD, 20 mg at 03/01/17 0831  •  ziprasidone (GEODON) injection 20 mg, 20 mg, Intramuscular, Q6H PRN, Riccardo Mckay III, MD, 20 mg at 02/23/17 1601       ASSESSMENT:  1.  Dementia, Alzheimer type with visual disturbances.    2.  Hypertension.    3.  Hyperlipidemia.    4.  Morbidly obese.    5.  Mildly dehydrated.    6.  History of temporal arthritis.     PLAN:   CPM  LABS NOTED  PER PSYCH  STRESS ULCER/DVT PROPHYLAXIS  D/W FAMILY      Grabiel Westfall M.D.

## 2017-03-01 NOTE — PLAN OF CARE
Problem:  Patient Care Overview (Adult)  Goal: Plan of Care Review  Outcome: Ongoing (interventions implemented as appropriate)    03/01/17 1532   Coping/Psychosocial Response Interventions   Plan Of Care Reviewed With patient   Coping/Psychosocial   Patient Agreement with Plan of Care agrees   Patient Care Overview   Progress improving   Outcome Evaluation   Outcome Summary/Follow up Plan Updated family about visitor approval for 3-4 peopl in the room at the time. pt was able to get up in [place] last night and less agitated today. Seems to be tolerating new medicine nudexta well. SW will continue to follow. PT to re-eval today.

## 2017-03-02 RX ADMIN — DIVALPROEX SODIUM 250 MG: 125 CAPSULE, COATED PELLETS ORAL at 09:11

## 2017-03-02 RX ADMIN — DIVALPROEX SODIUM 250 MG: 125 CAPSULE, COATED PELLETS ORAL at 17:55

## 2017-03-02 RX ADMIN — ZIPRASIDONE HYDROCHLORIDE 20 MG: 20 CAPSULE ORAL at 09:11

## 2017-03-02 RX ADMIN — ZIPRASIDONE HYDROCHLORIDE 20 MG: 20 CAPSULE ORAL at 17:55

## 2017-03-02 RX ADMIN — DONEPEZIL HYDROCHLORIDE 10 MG: 10 TABLET, FILM COATED ORAL at 20:40

## 2017-03-02 RX ADMIN — MEMANTINE HYDROCHLORIDE 10 MG: 10 TABLET, FILM COATED ORAL at 09:11

## 2017-03-02 RX ADMIN — DIVALPROEX SODIUM 250 MG: 125 CAPSULE, COATED PELLETS ORAL at 20:40

## 2017-03-02 RX ADMIN — HYDROCODONE BITARTATE AND ACETAMINOPHEN 1 TABLET: 5; 325 TABLET ORAL at 23:00

## 2017-03-02 RX ADMIN — ATORVASTATIN CALCIUM 40 MG: 40 TABLET, FILM COATED ORAL at 20:40

## 2017-03-02 RX ADMIN — PRIMIDONE 500 MG: 250 TABLET ORAL at 20:40

## 2017-03-02 RX ADMIN — DIVALPROEX SODIUM 250 MG: 125 CAPSULE, COATED PELLETS ORAL at 12:36

## 2017-03-02 RX ADMIN — MEMANTINE HYDROCHLORIDE 10 MG: 10 TABLET, FILM COATED ORAL at 17:55

## 2017-03-02 RX ADMIN — HYDROCODONE BITARTATE AND ACETAMINOPHEN 1 TABLET: 5; 325 TABLET ORAL at 15:27

## 2017-03-02 RX ADMIN — PANTOPRAZOLE SODIUM 40 MG: 40 TABLET, DELAYED RELEASE ORAL at 09:11

## 2017-03-02 RX ADMIN — PRIMIDONE 125 MG: 250 TABLET ORAL at 09:11

## 2017-03-02 RX ADMIN — HYDROCODONE BITARTATE AND ACETAMINOPHEN 1 TABLET: 5; 325 TABLET ORAL at 09:15

## 2017-03-02 RX ADMIN — METOPROLOL SUCCINATE 50 MG: 50 TABLET, FILM COATED, EXTENDED RELEASE ORAL at 09:11

## 2017-03-02 RX ADMIN — DEXTROMETHORPHAN HYDROBROMIDE AND QUINIDINE SULFATE 1 CAPSULE: 20; 10 CAPSULE, GELATIN COATED ORAL at 09:11

## 2017-03-02 NOTE — PLAN OF CARE
Problem:  Patient Care Overview (Adult)  Goal: Plan of Care Review  Outcome: Ongoing (interventions implemented as appropriate)    03/01/17 2105 03/02/17 0445   Coping/Psychosocial Response Interventions   Plan Of Care Reviewed With patient --    Coping/Psychosocial   Patient Agreement with Plan of Care agrees --    Outcome Evaluation   Outcome Summary/Follow up Plan --  Pt med compliant and cooperative, remains less agitated, pt very briefly yelled out, pt even able to keep cpap on some this evening, pt now wearing SCD/s, tolerating them well. Will continue to monitor changes in mood and behaviors, provide feedback and support.         Problem:  Overarching Goals  Goal: Adheres to Safety Considerations for Self and Others  Intervention: Develop/maintain Individualized Safety Plan    03/01/17 2105   Safety   Safety Measures safety rounds completed;suicide assessment completed   Mental Health Homicide Risk   Homicidal Ideation no   Willingness to Contact Staff Member if Feeling Like Hurting Others yes   Provide Emotional/Physical Safety   Suicidal Ideation no   Willingness to Contact Staff Member if Feeling Like Hurting Self yes         Goal: Optimized Coping Skills in Response to Life Stressors  Intervention: Promote Effective Coping Strategies    03/01/17 2105   Coping Strategies   Supportive Measures active listening utilized;relaxation techniques promoted;verbalization of feelings encouraged         Goal: Develops/Participates in Therapeutic [place] to Support Successful Transition  Intervention: Foster Therapeutic [place]    03/01/17 2105   Coping Strategies   Trust Relationship/Rapport care explained;choices provided;emotional support provided;empathic listening provided;questions answered;questions encouraged;reassurance provided;thoughts/feelings acknowledged       Intervention: Mutually Develop Transition Plan    03/01/17 2105   OTHER   Transition Support crisis management plan promoted

## 2017-03-02 NOTE — PLAN OF CARE
Problem: [facility] Patient Care Overview (Adult)  Goal: Plan of Care Review  Outcome: Ongoing (interventions implemented as appropriate)    03/01/17 2013   Coping/Psychosocial Response Interventions   Plan Of Care Reviewed With daughter   Coping/Psychosocial   Patient Agreement with Plan of Care unable to participate   Patient Care Overview   Progress improving   Outcome Evaluation   Outcome Summary/Follow up Plan PT con't to improve with disposition. Less agitation noted. Unable to assess mental health questions. Compliant with medications. Informed family daughter of PTs progress and status .Will con't to monitor.       Goal: Interdisciplinary Rounds/Family Conference  Outcome: Ongoing (interventions implemented as appropriate)  Goal: Individualization and Mutuality  Outcome: Ongoing (interventions implemented as appropriate)  Goal: Discharge Needs Assessment  Outcome: Ongoing (interventions implemented as appropriate)    Problem:  Overarching Goals  Goal: Adheres to Safety Considerations for Self and Others  Outcome: Ongoing (interventions implemented as appropriate)  Goal: Optimized Coping Skills in Response to Life Stressors  Outcome: Ongoing (interventions implemented as appropriate)  Goal: Develops/Participates in Therapeutic [place] to Support Successful Transition  Outcome: Ongoing (interventions implemented as appropriate)

## 2017-03-02 NOTE — SIGNIFICANT NOTE
03/02/17 1133   Rehab Treatment   Discipline physical therapy assistant   Treatment Not Performed patient/family declined treatment  (pnt initially participated w act ex BLE's, then refused, becoming agitated. )   Recommendation   PT - Next Appointment 03/03/17

## 2017-03-02 NOTE — PROGRESS NOTES
DAILY PROGRESS NOTE    CHIEF COMPLAINT  DOING BETTER  NO NEW ISSUES      HISTORY OF PRESENT ILLNESS: A 76-year-old  female with a history of dementia, hypertension, admitted through the emergency room to the psychiatric unit with increased agitation and combativeness. The patient is also having auditory and visual hallucinations. I am asked to follow the patient for medical problems and do the history and physical.     PHYSICAL EXAMINATION:Blood pressure 174/87, pulse 64, temperature 97.4 °F (36.3 °C), temperature source Oral, resp. rate 18, weight 252 lb (114 kg), SpO2 99 %.    GENERAL: Alert, combative, in no respiratory distress.   HEENT: Unremarkable.    NECK: Supple.   LUNGS: Decreased breath sounds.   HEART: S1 and S2.    ABDOMEN: Soft, nontender. Bowel sounds positive.   EXTREMITIES: No edema.    NEUROLOGIC: Moves all 4 extremities. Gait and balance not checked.     DIAGNOSTIC DATA:   Lab Results (last 24 hours)     ** No results found for the last 24 hours. **        Lab Results (last 24 hours)     ** No results found for the last 24 hours. **        Lab Results (last 24 hours)     ** No results found for the last 24 hours. **        Lab Results (last 24 hours)     ** No results found for the last 24 hours. **        Lab Results (last 24 hours)     ** No results found for the last 24 hours. **        Lab Results (last 24 hours)     ** No results found for the last 24 hours. **        Lab Results (last 24 hours)     ** No results found for the last 24 hours. **        Lab Results (last 24 hours)     ** No results found for the last 24 hours. **      Labs showed a potassium of 4.3, BUN 12, creatinine 1.1. White count 6.6, hemoglobin 10.7, platelets 226,000. UA is negative. Toxicology screen is positive for barbiturates.    Imaging Results (last 72 hours)     ** No results found for the last 72 hours. **        Current Facility-Administered Medications:   •  acetaminophen (TYLENOL) tablet 650  mg, 650 mg, Oral, Q6H PRN, Rene Marie MD, 650 mg at 02/19/17 1447  •  aluminum-magnesium hydroxide-simethicone (MAALOX/MYLANTA) suspension 30 mL, 30 mL, Oral, Q6H PRN, Riccardo Mckay III, MD, 30 mL at 02/22/17 1713  •  atorvastatin (LIPITOR) tablet 40 mg, 40 mg, Oral, Nightly, Grabiel Westfall MD, 40 mg at 03/01/17 2048  •  dextromethorphan-quinidine (NUEDEXTA) capsule 1 capsule, 1 capsule, Oral, Daily, 1 capsule at 03/02/17 0911 **FOLLOWED BY** [START ON 3/6/2017] dextromethorphan-quinidine (NUEDEXTA) capsule 1 capsule, 1 capsule, Oral, Q12H, Riccardo Mckay III, MD  •  Divalproex Sodium (DEPAKOTE SPRINKLE) capsule 250 mg, 250 mg, Oral, 4x Daily, Rene Marie MD, 250 mg at 03/02/17 1236  •  donepezil (ARICEPT) tablet 10 mg, 10 mg, Oral, Nightly, Riccardo Mckay III, MD, 10 mg at 03/01/17 2048  •  HYDROcodone-acetaminophen (NORCO) 5-325 MG per tablet 1 tablet, 1 tablet, Oral, Q6H, Riccardo Mckay III, MD, 1 tablet at 03/02/17 0915  •  ibuprofen (ADVIL,MOTRIN) tablet 400 mg, 400 mg, Oral, Q6H PRN, Rene Marie MD, 400 mg at 02/20/17 1851  •  memantine (NAMENDA) tablet 10 mg, 10 mg, Oral, BID, Riccardo Mckay III, MD, 10 mg at 03/02/17 0911  •  metoprolol succinate XL (TOPROL-XL) 24 hr tablet 50 mg, 50 mg, Oral, Daily, Grabiel Westfall MD, 50 mg at 03/02/17 0911  •  pantoprazole (PROTONIX) EC tablet 40 mg, 40 mg, Oral, Q AM, Grabiel Westfall MD, 40 mg at 03/02/17 0911  •  primidone (MYSOLINE) tablet 125 mg, 125 mg, Oral, Daily, Rene Marie MD, 125 mg at 03/02/17 0911  •  primidone (MYSOLINE) tablet 500 mg, 500 mg, Oral, Nightly, Rene Marie MD, 500 mg at 03/01/17 2047  •  ziprasidone (GEODON) capsule 20 mg, 20 mg, Oral, BID With Meals, Riccardo Mckay III, MD, 20 mg at 03/02/17 0911  •  ziprasidone (GEODON) injection 20 mg, 20 mg, Intramuscular, Q6H PRN, Riccardo Mckay III, MD, 20 mg at 02/23/17 1601        ASSESSMENT:  1.  Dementia, Alzheimer type with visual disturbances.    2.  Hypertension.    3.  Hyperlipidemia.    4.  Morbidly obese.    5.  Mildly dehydrated.    6.  History of temporal arthritis.     PLAN:   CPM  LABS NOTED  PER PSYCH  STRESS ULCER/DVT PROPHYLAXIS  D/W FAMILY      Grabiel Westfall M.D.

## 2017-03-02 NOTE — PROGRESS NOTES
Out in dayroom in chair    Continues to cry out loudly and seemingly w/o precipitant    Suspect this may be related to PBA and hope to see improvement w Nuedexta

## 2017-03-02 NOTE — PLAN OF CARE
Problem:  Patient Care Overview (Adult)  Goal: Plan of Care Review  Outcome: Ongoing (interventions implemented as appropriate)    03/02/17 0915 03/02/17 1629   Coping/Psychosocial Response Interventions   Plan Of Care Reviewed With --  patient   Coping/Psychosocial   Patient Agreement with Plan of Care unable to participate  (confused minimally cooperative ) --    Patient Care Overview   Progress --  improving   Outcome Evaluation   Outcome Summary/Follow up Plan --  Patient has been compliant with medication. She has been up to chair and in lounge today. She was minimally cooperative though with assessment and continues to yell out on and off through out the day but able to console her.  Will continue to monitor and provide support.            Goal: Interdisciplinary Rounds/Family Conference  Outcome: Ongoing (interventions implemented as appropriate)  Goal: Individualization and Mutuality  Outcome: Ongoing (interventions implemented as appropriate)  Goal: Discharge Needs Assessment  Outcome: Ongoing (interventions implemented as appropriate)    Problem:  Overarching Goals  Goal: Adheres to Safety Considerations for Self and Others  Outcome: Ongoing (interventions implemented as appropriate)  Intervention: Develop/maintain Individualized Safety Plan    02/27/17 0930 03/02/17 0915   Safety   Safety Measures --  safety rounds completed;suicide assessment completed   Mental Health Homicide Risk   Homicidal Ideation --  no  (confused minimally coop with aast questions)   Willingness to Contact Staff Member if Feeling Like Hurting Others --  yes   Provide Emotional/Physical Safety   Suicidal Ideation --  no  (confused minimally coop with asst questions)   Suicide Plan/Availability unable to assess --    Willingness to Contact Staff Member if Feeling Like Hurting Self --  yes         Goal: Optimized Coping Skills in Response to Life Stressors  Outcome: Ongoing (interventions implemented as  appropriate)  Intervention: Promote Effective Coping Strategies    03/02/17 0915   Coping Strategies   Supportive Measures active listening utilized;positive reinforcement provided;verbalization of feelings encouraged         Goal: Develops/Participates in Therapeutic [place] to Support Successful Transition  Outcome: Ongoing (interventions implemented as appropriate)  Intervention: Foster Therapeutic [place]    03/02/17 0915   Coping Strategies   Trust Relationship/Rapport care explained;choices provided;emotional support provided;empathic listening provided;questions answered;questions encouraged;reassurance provided;thoughts/feelings acknowledged       Intervention: Mutually Develop Transition Plan    03/02/17 0915   OTHER   Transition Support crisis management plan promoted           Problem: Alteration in Orientation  Goal: Treatment Goal: Demonstrate a reduction of confusion and improved orientation to person, place, time and/or situation upon discharge, according to optimum baseline/ability  Outcome: Ongoing (interventions implemented as appropriate)  Goal: Interact with staff daily  Outcome: Ongoing (interventions implemented as appropriate)  Goal: Express concerns related to confused thinking related to:  Outcome: Ongoing (interventions implemented as appropriate)  Goal: Allow medical examinations, as recommended  Outcome: Ongoing (interventions implemented as appropriate)  Goal: Cooperate with recommended testing/procedures  Outcome: Ongoing (interventions implemented as appropriate)  Goal: Attend and participate in unit activities, including therapeutic, recreational, and educational groups  Outcome: Ongoing (interventions implemented as appropriate)  Goal: Complete daily ADLs, including personal hygiene independently, as able  Outcome: Ongoing (interventions implemented as appropriate)    Problem: Alteration in Thoughts and Perception  Goal: Treatment Goal: Gain control of psychotic behaviors/thinking,  reduce/eliminate presenting symptoms and demonstrate improved reality functioning upon discharge  Outcome: Ongoing (interventions implemented as appropriate)  Goal: Verbalize thoughts and feelings associated with:  Outcome: Ongoing (interventions implemented as appropriate)  Goal: Refrain from acting on delusional thinking/internal stimuli  Outcome: Ongoing (interventions implemented as appropriate)  Goal: Agree to be compliant with medication regime, as prescribed and report medication side effects  Outcome: Ongoing (interventions implemented as appropriate)  Goal: Attend and participate in unit activities, including therapeutic, recreational, and educational groups  Outcome: Ongoing (interventions implemented as appropriate)  Goal: Recognize dysfunctional thoughts, communicate reality-based thoughts at the time of discharge  Outcome: Ongoing (interventions implemented as appropriate)  Goal: Complete daily ADLs, including personal hygiene independently, as able  Outcome: Ongoing (interventions implemented as appropriate)    Problem: Risk for Elopement/Wandering  Goal: Monitor Patient to prevent elopement/wandering  Outcome: Ongoing (interventions implemented as appropriate)    03/02/17 1629   OTHER   Monitor Patient to Prevent Elopement/Wandering No elopement behaviors this shift         Problem: Risk for Violence/Aggression Toward Others  Goal: Treatment Goal: Refrain from acts of violence/aggression during length of stay, and demonstrate improved impulse control at the time of discharge  Outcome: Ongoing (interventions implemented as appropriate)  Goal: Verbalize thoughts and feelings associated with:  Outcome: Ongoing (interventions implemented as appropriate)  Goal: Refrain from harming others  Outcome: Ongoing (interventions implemented as appropriate)  Goal: Refrain from destructive acts on the environment or property  Outcome: Ongoing (interventions implemented as appropriate)  Goal: Control angry  outbursts  Outcome: Ongoing (interventions implemented as appropriate)  Goal: Attend and participate in unit activities, including therapeutic, recreational, and educational groups  Outcome: Ongoing (interventions implemented as appropriate)  Goal: Identify appropriate positive anger management techniques  Outcome: Ongoing (interventions implemented as appropriate)    Problem: Fall Risk (Adult)  Intervention: Monitor/Assist with Self Care    03/02/17 0915 03/02/17 1629   Activity   Activity Type --  activity adjusted per tolerance;up in chair   Activity Level of Assistance assistance, 2 people --    Monitor/Assist with Self Care   Ambulation 3-->assistive equipment and person --    Transferring 3-->assistive equipment and person --    Toileting 2-->assistive person --    Bathing 2-->assistive person --    Dressing 2-->assistive person --    Eating 2-->assistive person --    Communication 2-->difficulty understanding (not related to language barrier) --    Swallowing (if score 2 or more for any item, consult Rehab Services) 0-->swallows foods/liquids without difficulty --    Musculoskeletal Interventions   Self-Care Promotion independence encouraged;BADL personal objects within reach;meal setup provided --        Intervention: Reduce Risk/Promote Restraint Free Environment    03/02/17 0651 03/02/17 0915 03/02/17 1600   Safety Interventions   Safety/Security Measures bed alarm set --  --    Environmental Safety Modification --  --  clutter free environment maintained;assistive device/personal items within reach   Restraint Interventions   Safety Promotion/Fall Prevention --  safety round/check completed;nonskid shoes/slippers when out of bed;fall prevention program maintained --          Goal: Absence of Falls  Outcome: Ongoing (interventions implemented as appropriate)    03/02/17 1629   Fall Risk (Adult)   Absence of Falls making progress toward outcome         03/02/17 1629   Fall Risk (Adult)   Absence of Falls  making progress toward outcome         Problem: Skin Integrity Impairment, Risk/Actual (Adult)  Intervention: Promote/Optimize Nutrition    03/01/17 2105   Nutrition Interventions   Oral Nutrition Promotion rest periods promoted   Hygiene Care Assistance   Oral Care oral care provided       Intervention: Prevent/Manage Excess Moisture    03/01/17 1100 03/01/17 2219   Hygiene Care Assistance   Perineal Care --  cleansed;perianal area cleansed   Hygiene Care   Bathing/Skin Care bath, complete;linen changed --          Goal: Skin Integrity/Wound Healing  Outcome: Ongoing (interventions implemented as appropriate)    03/02/17 1629   Skin Integrity Impairment, Risk/Actual (Adult)   Skin Integrity/Wound Healing making progress toward outcome

## 2017-03-03 RX ADMIN — DIVALPROEX SODIUM 250 MG: 125 CAPSULE, COATED PELLETS ORAL at 17:41

## 2017-03-03 RX ADMIN — PANTOPRAZOLE SODIUM 40 MG: 40 TABLET, DELAYED RELEASE ORAL at 09:33

## 2017-03-03 RX ADMIN — MEMANTINE HYDROCHLORIDE 10 MG: 10 TABLET, FILM COATED ORAL at 17:41

## 2017-03-03 RX ADMIN — METOPROLOL SUCCINATE 50 MG: 50 TABLET, FILM COATED, EXTENDED RELEASE ORAL at 09:33

## 2017-03-03 RX ADMIN — MEMANTINE HYDROCHLORIDE 10 MG: 10 TABLET, FILM COATED ORAL at 09:33

## 2017-03-03 RX ADMIN — HYDROCODONE BITARTATE AND ACETAMINOPHEN 1 TABLET: 5; 325 TABLET ORAL at 04:26

## 2017-03-03 RX ADMIN — DIVALPROEX SODIUM 250 MG: 125 CAPSULE, COATED PELLETS ORAL at 12:40

## 2017-03-03 RX ADMIN — HYDROCODONE BITARTATE AND ACETAMINOPHEN 1 TABLET: 5; 325 TABLET ORAL at 17:37

## 2017-03-03 RX ADMIN — PRIMIDONE 125 MG: 250 TABLET ORAL at 09:33

## 2017-03-03 RX ADMIN — HYDROCODONE BITARTATE AND ACETAMINOPHEN 1 TABLET: 5; 325 TABLET ORAL at 09:49

## 2017-03-03 RX ADMIN — ZIPRASIDONE HYDROCHLORIDE 20 MG: 20 CAPSULE ORAL at 09:32

## 2017-03-03 RX ADMIN — ZIPRASIDONE HYDROCHLORIDE 20 MG: 20 CAPSULE ORAL at 17:41

## 2017-03-03 RX ADMIN — DIVALPROEX SODIUM 250 MG: 125 CAPSULE, COATED PELLETS ORAL at 09:33

## 2017-03-03 RX ADMIN — DEXTROMETHORPHAN HYDROBROMIDE AND QUINIDINE SULFATE 1 CAPSULE: 20; 10 CAPSULE, GELATIN COATED ORAL at 09:32

## 2017-03-03 NOTE — SIGNIFICANT NOTE
"   03/03/17 1524   Rehab Treatment   Discipline physical therapy assistant   Treatment Not Performed patient/family declined treatment  (pnt asleep. She did open her eyes when she said \"leave me alone\". Pnt then closed her eyes, and kept them closed as I spoke with her.)   Recommendation   PT - Next Appointment 03/06/17     "

## 2017-03-03 NOTE — PROGRESS NOTES
DAILY PROGRESS NOTE    CHIEF COMPLAINT  DOING BETTER  NO NEW ISSUES      HISTORY OF PRESENT ILLNESS: A 76-year-old  female with a history of dementia, hypertension, admitted through the emergency room to the psychiatric unit with increased agitation and combativeness. The patient is also having auditory and visual hallucinations. I am asked to follow the patient for medical problems and do the history and physical.     PHYSICAL EXAMINATION:Blood pressure 170/84, pulse 68, temperature 97.1 °F (36.2 °C), temperature source Oral, resp. rate 20, weight 252 lb (114 kg), SpO2 96 %.    GENERAL: Alert, combative, in no respiratory distress.   HEENT: Unremarkable.    NECK: Supple.   LUNGS: Decreased breath sounds.   HEART: S1 and S2.    ABDOMEN: Soft, nontender. Bowel sounds positive.   EXTREMITIES: No edema.    NEUROLOGIC: Moves all 4 extremities. Gait and balance not checked.     DIAGNOSTIC DATA:   Lab Results (last 24 hours)     ** No results found for the last 24 hours. **        Lab Results (last 24 hours)     ** No results found for the last 24 hours. **        Lab Results (last 24 hours)     ** No results found for the last 24 hours. **        Lab Results (last 24 hours)     ** No results found for the last 24 hours. **        Lab Results (last 24 hours)     ** No results found for the last 24 hours. **        Lab Results (last 24 hours)     ** No results found for the last 24 hours. **        Lab Results (last 24 hours)     ** No results found for the last 24 hours. **        Lab Results (last 24 hours)     ** No results found for the last 24 hours. **      Labs showed a potassium of 4.3, BUN 12, creatinine 1.1. White count 6.6, hemoglobin 10.7, platelets 226,000. UA is negative. Toxicology screen is positive for barbiturates.    Imaging Results (last 72 hours)     ** No results found for the last 72 hours. **        Current Facility-Administered Medications:   •  acetaminophen (TYLENOL) tablet 650  mg, 650 mg, Oral, Q6H PRN, Rene Marie MD, 650 mg at 02/19/17 1447  •  aluminum-magnesium hydroxide-simethicone (MAALOX/MYLANTA) suspension 30 mL, 30 mL, Oral, Q6H PRN, Riccardo Mckay III, MD, 30 mL at 02/22/17 1713  •  atorvastatin (LIPITOR) tablet 40 mg, 40 mg, Oral, Nightly, Grabiel Westfall MD, 40 mg at 03/02/17 2040  •  dextromethorphan-quinidine (NUEDEXTA) capsule 1 capsule, 1 capsule, Oral, Daily, 1 capsule at 03/03/17 0932 **FOLLOWED BY** [START ON 3/6/2017] dextromethorphan-quinidine (NUEDEXTA) capsule 1 capsule, 1 capsule, Oral, Q12H, Riccardo Mckay III, MD  •  Divalproex Sodium (DEPAKOTE SPRINKLE) capsule 250 mg, 250 mg, Oral, 4x Daily, Rene Marie MD, 250 mg at 03/03/17 1240  •  donepezil (ARICEPT) tablet 10 mg, 10 mg, Oral, Nightly, Riccardo Mckay III, MD, 10 mg at 03/02/17 2040  •  HYDROcodone-acetaminophen (NORCO) 5-325 MG per tablet 1 tablet, 1 tablet, Oral, Q6H, Riccardo Mckay III, MD, 1 tablet at 03/03/17 0949  •  ibuprofen (ADVIL,MOTRIN) tablet 400 mg, 400 mg, Oral, Q6H PRN, Rene Marie MD, 400 mg at 02/20/17 1851  •  memantine (NAMENDA) tablet 10 mg, 10 mg, Oral, BID, Riccardo Mckay III, MD, 10 mg at 03/03/17 0933  •  metoprolol succinate XL (TOPROL-XL) 24 hr tablet 50 mg, 50 mg, Oral, Daily, Grabiel Westfall MD, 50 mg at 03/03/17 0933  •  pantoprazole (PROTONIX) EC tablet 40 mg, 40 mg, Oral, Q AM, Grabiel Westfall MD, 40 mg at 03/03/17 0933  •  primidone (MYSOLINE) tablet 125 mg, 125 mg, Oral, Daily, Rnee Marie MD, 125 mg at 03/03/17 0933  •  primidone (MYSOLINE) tablet 500 mg, 500 mg, Oral, Nightly, Rene Marie MD, 500 mg at 03/02/17 2040  •  ziprasidone (GEODON) capsule 20 mg, 20 mg, Oral, BID With Meals, Riccardo Mckay III, MD, 20 mg at 03/03/17 0932  •  ziprasidone (GEODON) injection 20 mg, 20 mg, Intramuscular, Q6H PRN, Riccardo Mckay III, MD, 20 mg at 02/23/17 1601        ASSESSMENT:  1.  Dementia, Alzheimer type with visual disturbances.    2.  Hypertension.    3.  Hyperlipidemia.    4.  Morbidly obese.    5.  Mildly dehydrated.    6.  History of temporal arthritis.     PLAN:   CPM  PER PSYCH  STRESS ULCER/DVT PROPHYLAXIS  D/W FAMILY      Grabiel Westfall M.D.

## 2017-03-03 NOTE — PLAN OF CARE
Problem:  Patient Care Overview (Adult)  Goal: Plan of Care Review  Outcome: Ongoing (interventions implemented as appropriate)    03/03/17 6787   Coping/Psychosocial Response Interventions   Plan Of Care Reviewed With son   Coping/Psychosocial   Patient Agreement with Plan of Care unable to participate  (left vm)   Patient Care Overview   Progress improving   Outcome Evaluation   Outcome Summary/Follow up Plan If pt is stable through weekend, this SW will be able to make referrals for SNF's this weekend. Pt showing improvement in yelling out and even transferring by coming to stand. pt is able to tolerate sitting in recliner for short periods and is overall compliant with meds. This is the first initial day staff has observed improvement in multiple areas. if pt maintains progress over weekend, this SW will be able to refer Monday with hopes of transitioning by EOW. Message left for pt's son Jacobo ([phone]) for update. SW will continue to follow and will review Monday

## 2017-03-03 NOTE — PLAN OF CARE
Problem:  Patient Care Overview (Adult)  Goal: Plan of Care Review  Outcome: Ongoing (interventions implemented as appropriate)    03/02/17 0915 03/02/17 1629 03/03/17 0324   Coping/Psychosocial Response Interventions   Plan Of Care Reviewed With --  patient --    Coping/Psychosocial   Patient Agreement with Plan of Care unable to participate  (confused minimally cooperative ) --  --    Outcome Evaluation   Outcome Summary/Follow up Plan --  --  Pt continues to be med compliant and cooperative, still yelling out some this shift, no signs of aggression. Will continue to monitor changes in mood and behaviors, provide feedback and support.         Problem:  Overarching Goals  Goal: Adheres to Safety Considerations for Self and Others  Intervention: Develop/maintain Individualized Safety Plan    03/02/17 0915   Safety   Safety Measures safety rounds completed;suicide assessment completed   Mental Health Homicide Risk   Homicidal Ideation no  (confused minimally coop with aast questions)   Willingness to Contact Staff Member if Feeling Like Hurting Others yes   Provide Emotional/Physical Safety   Suicidal Ideation no  (confused minimally coop with asst questions)   Willingness to Contact Staff Member if Feeling Like Hurting Self yes         Goal: Optimized Coping Skills in Response to Life Stressors  Intervention: Promote Effective Coping Strategies    03/02/17 0915   Coping Strategies   Supportive Measures active listening utilized;positive reinforcement provided;verbalization of feelings encouraged         Goal: Develops/Participates in Therapeutic [place] to Support Successful Transition  Intervention: Foster Therapeutic [place]    03/02/17 0915   Coping Strategies   Trust Relationship/Rapport care explained;choices provided;emotional support provided;empathic listening provided;questions answered;questions encouraged;reassurance provided;thoughts/feelings acknowledged       Intervention: Mutually Develop  Transition Plan    03/02/17 0915   OTHER   Transition Support crisis management plan promoted

## 2017-03-03 NOTE — PLAN OF CARE
Problem: [facility] Patient Care Overview (Adult)  Goal: Interdisciplinary Rounds/Family Conference  Outcome: Ongoing (interventions implemented as appropriate)    03/03/17 4038   Interdisciplinary Rounds/Family Conf   Summary treatment team met to review plan of care. patient has been compliant medications. patient has been able to transfer from bed to recliner with 2 stand by assist. decreased yelling/screaming out. remains psychotic. family is supportive.   Participants ;nursing;psychiatrist;social work      Patient/Guardian Signature: __________________________________             Psychiatrist Signature: ______________________________________             Therapist Signature: ________________________________________              Nurse Signature: ___________________________________________              Staff Signature: ____________________________________________             Staff Signature: ____________________________________________              Staff Signature: ____________________________________________              Staff Signature:                                                                                                        Problem: Alteration in Orientation  Goal: Treatment Goal: Demonstrate a reduction of confusion and improved orientation to person, place, time and/or situation upon discharge, according to optimum baseline/ability  Outcome: Ongoing (interventions implemented as appropriate)  Goal: Interact with staff daily  Outcome: Ongoing (interventions implemented as appropriate)  Goal: Allow medical examinations, as recommended  Outcome: Ongoing (interventions implemented as appropriate)  Goal: Cooperate with recommended testing/procedures  Outcome: Ongoing (interventions implemented as appropriate)  Goal: Complete daily ADLs, including personal hygiene independently, as able  Outcome: Ongoing (interventions implemented as appropriate)    Problem: Alteration in Thoughts and  Perception  Goal: Verbalize thoughts and feelings associated with:  Outcome: Ongoing (interventions implemented as appropriate)  Goal: Refrain from acting on delusional thinking/internal stimuli  Outcome: Ongoing (interventions implemented as appropriate)  Goal: Agree to be compliant with medication regime, as prescribed and report medication side effects  Outcome: Ongoing (interventions implemented as appropriate)  Goal: Attend and participate in unit activities, including therapeutic, recreational, and educational groups  Outcome: Ongoing (interventions implemented as appropriate)  Unable to participate in activities due to memory deficits  Goal: Recognize dysfunctional thoughts, communicate reality-based thoughts at the time of discharge  Outcome: Unable to achieve outcome(s) by discharge Date Met:  03/03/17  Unable to recognize dysfunctional thoughts.  Goal: Complete daily ADLs, including personal hygiene independently, as able  Outcome: Ongoing (interventions implemented as appropriate)  Needs assistance with staff    Problem: Risk for Elopement/Wandering  Goal: Monitor Patient to prevent elopement/wandering  Outcome: Ongoing (interventions implemented as appropriate)

## 2017-03-04 RX ADMIN — DIVALPROEX SODIUM 250 MG: 125 CAPSULE, COATED PELLETS ORAL at 00:04

## 2017-03-04 RX ADMIN — PRIMIDONE 500 MG: 250 TABLET ORAL at 00:04

## 2017-03-04 RX ADMIN — DONEPEZIL HYDROCHLORIDE 10 MG: 10 TABLET, FILM COATED ORAL at 23:11

## 2017-03-04 RX ADMIN — DIVALPROEX SODIUM 250 MG: 125 CAPSULE, COATED PELLETS ORAL at 15:30

## 2017-03-04 RX ADMIN — HYDROCODONE BITARTATE AND ACETAMINOPHEN 1 TABLET: 5; 325 TABLET ORAL at 23:11

## 2017-03-04 RX ADMIN — ZIPRASIDONE HYDROCHLORIDE 20 MG: 20 CAPSULE ORAL at 19:22

## 2017-03-04 RX ADMIN — PRIMIDONE 500 MG: 250 TABLET ORAL at 23:11

## 2017-03-04 RX ADMIN — HYDROCODONE BITARTATE AND ACETAMINOPHEN 1 TABLET: 5; 325 TABLET ORAL at 00:03

## 2017-03-04 RX ADMIN — ATORVASTATIN CALCIUM 40 MG: 40 TABLET, FILM COATED ORAL at 00:04

## 2017-03-04 RX ADMIN — PANTOPRAZOLE SODIUM 40 MG: 40 TABLET, DELAYED RELEASE ORAL at 10:38

## 2017-03-04 RX ADMIN — ZIPRASIDONE HYDROCHLORIDE 20 MG: 20 CAPSULE ORAL at 10:32

## 2017-03-04 RX ADMIN — MEMANTINE HYDROCHLORIDE 10 MG: 10 TABLET, FILM COATED ORAL at 10:33

## 2017-03-04 RX ADMIN — DIVALPROEX SODIUM 250 MG: 125 CAPSULE, COATED PELLETS ORAL at 10:32

## 2017-03-04 RX ADMIN — MEMANTINE HYDROCHLORIDE 10 MG: 10 TABLET, FILM COATED ORAL at 19:22

## 2017-03-04 RX ADMIN — ATORVASTATIN CALCIUM 40 MG: 40 TABLET, FILM COATED ORAL at 23:11

## 2017-03-04 RX ADMIN — PRIMIDONE 125 MG: 250 TABLET ORAL at 10:34

## 2017-03-04 RX ADMIN — DONEPEZIL HYDROCHLORIDE 10 MG: 10 TABLET, FILM COATED ORAL at 00:04

## 2017-03-04 RX ADMIN — DEXTROMETHORPHAN HYDROBROMIDE AND QUINIDINE SULFATE 1 CAPSULE: 20; 10 CAPSULE, GELATIN COATED ORAL at 10:33

## 2017-03-04 RX ADMIN — METOPROLOL SUCCINATE 50 MG: 50 TABLET, FILM COATED, EXTENDED RELEASE ORAL at 10:32

## 2017-03-04 RX ADMIN — HYDROCODONE BITARTATE AND ACETAMINOPHEN 1 TABLET: 5; 325 TABLET ORAL at 10:38

## 2017-03-04 NOTE — PLAN OF CARE
Problem:  Patient Care Overview (Adult)  Goal: Plan of Care Review  Outcome: Ongoing (interventions implemented as appropriate)    02/19/17 1103 03/04/17 0433 03/04/17 0541   Coping/Psychosocial Response Interventions   Plan Of Care Reviewed With --  patient --    Coping/Psychosocial   Patient Agreement with Plan of Care --  unable to participate --    Patient Care Overview   Consent Given to Review Plan with Pt presents w increased generalized weakness as well as impaired cognition limiting pts safety and independence w functional mobility. Due to the above pt will benifit from skilled PT. --  --    Progress --  --  no change   Outcome Evaluation   Outcome Summary/Follow up Plan --  --  Unable to assess anxiety, depression, and SI/HI. Cooperative and med compliant. Continue to monitor and assess.        Goal: Interdisciplinary Rounds/Family Conference  Outcome: Ongoing (interventions implemented as appropriate)  Goal: Individualization and Mutuality  Outcome: Ongoing (interventions implemented as appropriate)  Goal: Discharge Needs Assessment  Outcome: Ongoing (interventions implemented as appropriate)    Problem:  Overarching Goals  Goal: Adheres to Safety Considerations for Self and Others  Outcome: Ongoing (interventions implemented as appropriate)  Intervention: Develop/maintain Individualized Safety Plan    02/27/17 0930 03/02/17 0915 03/04/17 0433   Safety   Safety Measures --  --  safety rounds completed;suicide assessment completed   Mental Health Homicide Risk   Homicidal Ideation --  --  other (see comments)  (unable to assess)   Willingness to Contact Staff Member if Feeling Like Hurting Others --  yes --    Provide Emotional/Physical Safety   Suicidal Ideation --  --  other (see comments)  (unable to assess r/t confusion)   Suicide Plan/Availability unable to assess --  --    Willingness to Contact Staff Member if Feeling Like Hurting Self --  yes --          Goal: Optimized Coping Skills in  Response to Life Stressors  Outcome: Ongoing (interventions implemented as appropriate)  Intervention: Promote Effective Coping Strategies    03/04/17 0433   Coping Strategies   Supportive Measures active listening utilized;positive reinforcement provided;self-care encouraged;self-reflection promoted;self-responsibility promoted;verbalization of feelings encouraged         Goal: Develops/Participates in Therapeutic [place] to Support Successful Transition  Outcome: Ongoing (interventions implemented as appropriate)  Intervention: Foster Therapeutic [place]    03/04/17 0433   Coping Strategies   Trust Relationship/Rapport care explained;choices provided;emotional support provided;empathic listening provided;questions answered;questions encouraged;reassurance provided;thoughts/feelings acknowledged       Intervention: Mutually Develop Transition Plan    03/04/17 0433   OTHER   Transition Support crisis management plan promoted

## 2017-03-04 NOTE — PROGRESS NOTES
" DAILY PROGRESS NOTE    CHIEF COMPLAINT  DOING BETTER  NO NEW ISSUES      HISTORY OF PRESENT ILLNESS: A 76-year-old  female with a history of dementia, hypertension, admitted through the emergency room to the psychiatric unit with increased agitation and combativeness. The patient is also having auditory and visual hallucinations. I am asked to follow the patient for medical problems and do the history and physical.     PHYSICAL EXAMINATION:Blood pressure 162/77, pulse 72, temperature 97.1 °F (36.2 °C), temperature source Oral, resp. rate 18, height 64\" (162.6 cm), weight 252 lb (114 kg), SpO2 96 %.    GENERAL: Alert, combative, in no respiratory distress.   HEENT: Unremarkable.    NECK: Supple.   LUNGS: Decreased breath sounds.   HEART: S1 and S2.    ABDOMEN: Soft, nontender. Bowel sounds positive.   EXTREMITIES: No edema.    NEUROLOGIC: Moves all 4 extremities. Gait and balance not checked.     DIAGNOSTIC DATA:   Lab Results (last 24 hours)     ** No results found for the last 24 hours. **        Lab Results (last 24 hours)     ** No results found for the last 24 hours. **        Lab Results (last 24 hours)     ** No results found for the last 24 hours. **        Lab Results (last 24 hours)     ** No results found for the last 24 hours. **        Lab Results (last 24 hours)     ** No results found for the last 24 hours. **        Lab Results (last 24 hours)     ** No results found for the last 24 hours. **        Lab Results (last 24 hours)     ** No results found for the last 24 hours. **        Lab Results (last 24 hours)     ** No results found for the last 24 hours. **      Labs showed a potassium of 4.3, BUN 12, creatinine 1.1. White count 6.6, hemoglobin 10.7, platelets 226,000. UA is negative. Toxicology screen is positive for barbiturates.    Imaging Results (last 72 hours)     ** No results found for the last 72 hours. **        Current Facility-Administered Medications:   •  acetaminophen " (TYLENOL) tablet 650 mg, 650 mg, Oral, Q6H PRN, Rene Marie MD, 650 mg at 02/19/17 1447  •  aluminum-magnesium hydroxide-simethicone (MAALOX/MYLANTA) suspension 30 mL, 30 mL, Oral, Q6H PRN, Riccardo Mckay III, MD, 30 mL at 02/22/17 1713  •  atorvastatin (LIPITOR) tablet 40 mg, 40 mg, Oral, Nightly, Grabiel Westfall MD, 40 mg at 03/04/17 0004  •  dextromethorphan-quinidine (NUEDEXTA) capsule 1 capsule, 1 capsule, Oral, Daily, 1 capsule at 03/04/17 1033 **FOLLOWED BY** [START ON 3/6/2017] dextromethorphan-quinidine (NUEDEXTA) capsule 1 capsule, 1 capsule, Oral, Q12H, Riccardo Mckay III, MD  •  Divalproex Sodium (DEPAKOTE SPRINKLE) capsule 250 mg, 250 mg, Oral, 4x Daily, Rene Marie MD, 250 mg at 03/04/17 1032  •  donepezil (ARICEPT) tablet 10 mg, 10 mg, Oral, Nightly, Riccardo Mckay III, MD, 10 mg at 03/04/17 0004  •  HYDROcodone-acetaminophen (NORCO) 5-325 MG per tablet 1 tablet, 1 tablet, Oral, Q6H, Riccardo Mckay III, MD, 1 tablet at 03/04/17 1038  •  ibuprofen (ADVIL,MOTRIN) tablet 400 mg, 400 mg, Oral, Q6H PRN, Rene Marie MD, 400 mg at 02/20/17 1851  •  magnesium hydroxide (MILK OF MAGNESIA) suspension 2400 mg/10mL 10 mL, 10 mL, Oral, Daily PRN, Riccardo Mckay III, MD, 10 mL at 03/03/17 1618  •  memantine (NAMENDA) tablet 10 mg, 10 mg, Oral, BID, Riccardo Mckay III, MD, 10 mg at 03/04/17 1033  •  metoprolol succinate XL (TOPROL-XL) 24 hr tablet 50 mg, 50 mg, Oral, Daily, Grabiel Westfall MD, 50 mg at 03/04/17 1032  •  pantoprazole (PROTONIX) EC tablet 40 mg, 40 mg, Oral, Q AM, Grabiel Westfall MD, 40 mg at 03/04/17 1038  •  primidone (MYSOLINE) tablet 125 mg, 125 mg, Oral, Daily, Rene Marie MD, 125 mg at 03/04/17 1034  •  primidone (MYSOLINE) tablet 500 mg, 500 mg, Oral, Nightly, Rene Marie MD, 500 mg at 03/04/17 0004  •  ziprasidone (GEODON) capsule 20 mg, 20 mg, Oral, BID With Meals, Riccardo Mckay  III, MD, 20 mg at 03/04/17 1032  •  ziprasidone (GEODON) injection 20 mg, 20 mg, Intramuscular, Q6H PRN, Riccardo Mckay III, MD, 20 mg at 02/23/17 1601       ASSESSMENT:  1.  Dementia, Alzheimer type with visual disturbances.    2.  Hypertension.    3.  Hyperlipidemia.    4.  Morbidly obese.    5.  Mildly dehydrated.    6.  History of temporal arthritis.     PLAN:   CPM  PER PSYCH  STRESS ULCER/DVT PROPHYLAXIS  D/W FAMILY      Grabiel Westfall M.D.

## 2017-03-04 NOTE — PLAN OF CARE
Problem:  Patient Care Overview (Adult)  Goal: Plan of Care Review  Outcome: Ongoing (interventions implemented as appropriate)    03/03/17 2019   Coping/Psychosocial Response Interventions   Plan Of Care Reviewed With patient   Coping/Psychosocial   Patient Agreement with Plan of Care unable to participate  (confused )   Patient Care Overview   Progress no change   Outcome Evaluation   Outcome Summary/Follow up Plan Patient cooperative with medication, but not fully cooperative with assessment, as she would not answer questions. Was alert to self only. Was slightly apprehensive and irritable with hands on care, but able to redirect and console with less yelling out.  Will continue to monitor and provide support.          Goal: Interdisciplinary Rounds/Family Conference  Outcome: Ongoing (interventions implemented as appropriate)  Goal: Individualization and Mutuality  Outcome: Ongoing (interventions implemented as appropriate)  Goal: Discharge Needs Assessment  Outcome: Ongoing (interventions implemented as appropriate)    Problem:  Overarching Goals  Goal: Adheres to Safety Considerations for Self and Others  Outcome: Ongoing (interventions implemented as appropriate)  Intervention: Develop/maintain Individualized Safety Plan    02/27/17 0930 03/02/17 0915 03/03/17 0930   Safety   Safety Measures --  --  --    Mental Health Homicide Risk   Homicidal Ideation --  --  (could not assess pt confused minimally coop with asst)   Willingness to Contact Staff Member if Feeling Like Hurting Others --  yes --    Provide Emotional/Physical Safety   Suicidal Ideation --  --  (could not assessconfused/minimally coop with asst)   Suicide Plan/Availability unable to assess --  --    Willingness to Contact Staff Member if Feeling Like Hurting Self --  yes --      03/03/17 1900   Safety   Safety Measures safety rounds completed   Mental Health Homicide Risk   Homicidal Ideation --    Willingness to Contact Staff Member if  Feeling Like Hurting Others --    Provide Emotional/Physical Safety   Suicidal Ideation --    Suicide Plan/Availability --    Willingness to Contact Staff Member if Feeling Like Hurting Self --          Goal: Optimized Coping Skills in Response to Life Stressors  Outcome: Ongoing (interventions implemented as appropriate)  Intervention: Promote Effective Coping Strategies    03/03/17 0930   Coping Strategies   Supportive Measures active listening utilized;positive reinforcement provided;verbalization of feelings encouraged;relaxation techniques promoted         Goal: Develops/Participates in Therapeutic [place] to Support Successful Transition  Outcome: Ongoing (interventions implemented as appropriate)  Intervention: Foster Therapeutic [place]    03/03/17 0930   Coping Strategies   Trust Relationship/Rapport care explained;choices provided;emotional support provided;empathic listening provided;questions answered;questions encouraged;reassurance provided;thoughts/feelings acknowledged       Intervention: Mutually Develop Transition Plan    03/02/17 0915   OTHER   Transition Support crisis management plan promoted           Problem: Alteration in Orientation  Goal: Treatment Goal: Demonstrate a reduction of confusion and improved orientation to person, place, time and/or situation upon discharge, according to optimum baseline/ability  Outcome: Ongoing (interventions implemented as appropriate)  Goal: Interact with staff daily  Outcome: Ongoing (interventions implemented as appropriate)  Goal: Express concerns related to confused thinking related to:  Outcome: Ongoing (interventions implemented as appropriate)  Goal: Allow medical examinations, as recommended  Outcome: Ongoing (interventions implemented as appropriate)  Goal: Cooperate with recommended testing/procedures  Outcome: Ongoing (interventions implemented as appropriate)  Goal: Attend and participate in unit activities, including therapeutic, recreational,  and educational groups  Outcome: Ongoing (interventions implemented as appropriate)  Goal: Complete daily ADLs, including personal hygiene independently, as able  Outcome: Ongoing (interventions implemented as appropriate)    Problem: Alteration in Thoughts and Perception  Goal: Treatment Goal: Gain control of psychotic behaviors/thinking, reduce/eliminate presenting symptoms and demonstrate improved reality functioning upon discharge  Outcome: Ongoing (interventions implemented as appropriate)  Goal: Verbalize thoughts and feelings associated with:  Outcome: Ongoing (interventions implemented as appropriate)  Goal: Refrain from acting on delusional thinking/internal stimuli  Outcome: Ongoing (interventions implemented as appropriate)  Goal: Agree to be compliant with medication regime, as prescribed and report medication side effects  Outcome: Ongoing (interventions implemented as appropriate)  Goal: Attend and participate in unit activities, including therapeutic, recreational, and educational groups  Outcome: Ongoing (interventions implemented as appropriate)  Goal: Complete daily ADLs, including personal hygiene independently, as able  Outcome: Ongoing (interventions implemented as appropriate)    Problem: Risk for Elopement/Wandering  Goal: Monitor Patient to prevent elopement/wandering  Outcome: Ongoing (interventions implemented as appropriate)    03/03/17 2019   OTHER   Monitor Patient to Prevent Elopement/Wandering no elopement issues this shift         Problem: Fall Risk (Adult)  Intervention: Monitor/Assist with Self Care    03/03/17 0930 03/03/17 1930   Activity   Activity Type --  activity adjusted per tolerance   Activity Level of Assistance --  assistance, 2 people   Monitor/Assist with Self Care   Ambulation 3-->assistive equipment and person --    Transferring 3-->assistive equipment and person --    Toileting 3-->assistive equipment and person --    Bathing 2-->assistive person --    Dressing  2-->assistive person --    Eating 2-->assistive person --    Communication 2-->difficulty understanding (not related to language barrier) --    Swallowing (if score 2 or more for any item, consult Rehab Services) 0-->swallows foods/liquids without difficulty --    Musculoskeletal Interventions   Self-Care Promotion independence encouraged;BADL personal objects within reach;meal setup provided --        Intervention: Reduce Risk/Promote Restraint Free Environment    03/03/17 1900   Safety Interventions   Safety/Security Measures family to remain at bedside   Environmental Safety Modification clutter free environment maintained;assistive device/personal items within reach;lighting adjusted;room near unit station;room organization consistent  (Simultaneous filing. User may be unaware of other data.)   Restraint Interventions   Safety Promotion/Fall Prevention safety round/check completed;fall prevention program maintained;nonskid shoes/slippers when out of bed  (Simultaneous filing. User may be unaware of other data.)       Intervention: Review Medications/Identify Contributors to Fall Risk    02/18/17 1636   Safety Interventions   Medication Review/Management provider consulted         Goal: Absence of Falls  Outcome: Ongoing (interventions implemented as appropriate)    03/03/17 2019   Fall Risk (Adult)   Absence of Falls making progress toward outcome         Problem: Skin Integrity Impairment, Risk/Actual (Adult)  Intervention: Promote/Optimize Nutrition    03/01/17 2105   Nutrition Interventions   Oral Nutrition Promotion rest periods promoted   Hygiene Care Assistance   Oral Care oral care provided       Intervention: Prevent/Manage Excess Moisture    03/03/17 0940 03/03/17 1436   Hygiene Care Assistance   Perineal Care perianal area cleansed;cleansed;absorbent pad changed --    Hygiene Care   Bathing/Skin Care --  incontinence care       Intervention: Prevent/Minimize Sheer/Friction Injuries    03/02/17 0915  03/03/17 1930   Skin Interventions   Pressure Reduction Techniques weight shift assistance provided;pressure points protected --    Pressure Reduction Devices specialty bed utilized  (SCD's while in bed) --    Positioning   Positioning/Transfer Devices --  pillows;in use         Goal: Skin Integrity/Wound Healing  Outcome: Ongoing (interventions implemented as appropriate)

## 2017-03-04 NOTE — PLAN OF CARE
"Problem:  Patient Care Overview (Adult)  Goal: Plan of Care Review  Outcome: Ongoing (interventions implemented as appropriate)    02/19/17 1103 03/04/17 1030 03/04/17 1703   Coping/Psychosocial Response Interventions   Plan Of Care Reviewed With --  patient  (interventions explained as care is done) --    Coping/Psychosocial   Patient Agreement with Plan of Care --  unable to participate --    Patient Care Overview   Consent Given to Review Plan with Pt presents w increased generalized weakness as well as impaired cognition limiting pts safety and independence w functional mobility. Due to the above pt will benifit from skilled PT. --  --    Outcome Evaluation   Outcome Summary/Follow up Plan --  --  The pt slept and yelled out randomly at fewer intervals than in the past. She was not overly cooperative, but was coaxed into taking her medications by a male RN that she favors. She stated, \"just get out, or No\" on approach, and asked this RN to leave her alone as I was evil.         Problem:  Overarching Goals  Goal: Develops/Participates in Therapeutic [place] to Support Successful Transition  Intervention: Foster Therapeutic [place]    03/04/17 1030   Coping Strategies   Trust Relationship/Rapport care explained;choices provided;emotional support provided;questions answered;questions encouraged;reassurance provided;thoughts/feelings acknowledged       Intervention: Mutually Develop Transition Plan    03/04/17 1703   OTHER   Transition Support (pt unwilling to engage except to say no or get out)             "

## 2017-03-05 RX ADMIN — HYDROCODONE BITARTATE AND ACETAMINOPHEN 1 TABLET: 5; 325 TABLET ORAL at 21:49

## 2017-03-05 RX ADMIN — DIVALPROEX SODIUM 250 MG: 125 CAPSULE, COATED PELLETS ORAL at 08:56

## 2017-03-05 RX ADMIN — PRIMIDONE 500 MG: 250 TABLET ORAL at 21:49

## 2017-03-05 RX ADMIN — METOPROLOL SUCCINATE 50 MG: 50 TABLET, FILM COATED, EXTENDED RELEASE ORAL at 08:57

## 2017-03-05 RX ADMIN — DEXTROMETHORPHAN HYDROBROMIDE AND QUINIDINE SULFATE 1 CAPSULE: 20; 10 CAPSULE, GELATIN COATED ORAL at 13:16

## 2017-03-05 RX ADMIN — DONEPEZIL HYDROCHLORIDE 10 MG: 10 TABLET, FILM COATED ORAL at 21:49

## 2017-03-05 RX ADMIN — MEMANTINE HYDROCHLORIDE 10 MG: 10 TABLET, FILM COATED ORAL at 08:57

## 2017-03-05 RX ADMIN — DIVALPROEX SODIUM 250 MG: 125 CAPSULE, COATED PELLETS ORAL at 21:49

## 2017-03-05 RX ADMIN — DIVALPROEX SODIUM 250 MG: 125 CAPSULE, COATED PELLETS ORAL at 13:15

## 2017-03-05 RX ADMIN — ZIPRASIDONE HYDROCHLORIDE 20 MG: 20 CAPSULE ORAL at 08:57

## 2017-03-05 RX ADMIN — PANTOPRAZOLE SODIUM 40 MG: 40 TABLET, DELAYED RELEASE ORAL at 08:58

## 2017-03-05 RX ADMIN — PRIMIDONE 125 MG: 250 TABLET ORAL at 08:57

## 2017-03-05 RX ADMIN — ZIPRASIDONE HYDROCHLORIDE 20 MG: 20 CAPSULE ORAL at 19:04

## 2017-03-05 RX ADMIN — ATORVASTATIN CALCIUM 40 MG: 40 TABLET, FILM COATED ORAL at 21:49

## 2017-03-05 RX ADMIN — MEMANTINE HYDROCHLORIDE 10 MG: 10 TABLET, FILM COATED ORAL at 19:04

## 2017-03-05 RX ADMIN — DIVALPROEX SODIUM 250 MG: 125 CAPSULE, COATED PELLETS ORAL at 19:03

## 2017-03-05 NOTE — PLAN OF CARE
"Problem:  Patient Care Overview (Adult)  Goal: Plan of Care Review  Outcome: Ongoing (interventions implemented as appropriate)    03/05/17 1100 03/05/17 1636   Coping/Psychosocial Response Interventions   Plan Of Care Reviewed With patient  (every intervention explained) --    Coping/Psychosocial   Patient Agreement with Plan of Care other (see comments)  (sometimes, othertimesstates, \"no\") --    Outcome Evaluation   Outcome Summary/Follow up Plan --  The pt slept peacefully on and off this shift. She was gotten OOB and she reluctantly joined the milieu. She fell asleep soon after. Her family arrived andvisited in her room,. She wasmuch more alert at that point. She responded appropriately to requests, such as stating, \"you can toss it.\" when this RN asked her if she wanted the rest of her applesause or if she wanted me to take it away. She denies pain, and declined her [place], and the family was made aware during the visit.         Problem:  Overarching Goals  Goal: Develops/Participates in Therapeutic [place] to Support Successful Transition  Intervention: Foster Therapeutic [place]    03/05/17 1100   Coping Strategies   Trust Relationship/Rapport care explained;choices provided;emotional support provided;empathic listening provided;questions answered;questions encouraged;reassurance provided;thoughts/feelings acknowledged       Intervention: Mutually Develop Transition Plan    03/04/17 1703   OTHER   Transition Support (pt unwilling to engage except to say no or get out)             "

## 2017-03-05 NOTE — PROGRESS NOTES
" DAILY PROGRESS NOTE    CHIEF COMPLAINT  DOING BETTER  NO NEW ISSUES      HISTORY OF PRESENT ILLNESS: A 76-year-old  female with a history of dementia, hypertension, admitted through the emergency room to the psychiatric unit with increased agitation and combativeness. The patient is also having auditory and visual hallucinations. I am asked to follow the patient for medical problems and do the history and physical.     PHYSICAL EXAMINATION:Blood pressure 150/61, pulse 68, temperature 98.8 °F (37.1 °C), temperature source Oral, resp. rate 18, height 64\" (162.6 cm), weight 252 lb (114 kg), SpO2 98 %.    GENERAL: Alert, combative, in no respiratory distress.   HEENT: Unremarkable.    NECK: Supple.   LUNGS: Decreased breath sounds.   HEART: S1 and S2.    ABDOMEN: Soft, nontender. Bowel sounds positive.   EXTREMITIES: No edema.    NEUROLOGIC: Moves all 4 extremities. Gait and balance not checked.     DIAGNOSTIC DATA:   Lab Results (last 24 hours)     ** No results found for the last 24 hours. **        Lab Results (last 24 hours)     ** No results found for the last 24 hours. **        Lab Results (last 24 hours)     ** No results found for the last 24 hours. **        Lab Results (last 24 hours)     ** No results found for the last 24 hours. **        Lab Results (last 24 hours)     ** No results found for the last 24 hours. **        Lab Results (last 24 hours)     ** No results found for the last 24 hours. **        Lab Results (last 24 hours)     ** No results found for the last 24 hours. **        Lab Results (last 24 hours)     ** No results found for the last 24 hours. **      Labs showed a potassium of 4.3, BUN 12, creatinine 1.1. White count 6.6, hemoglobin 10.7, platelets 226,000. UA is negative. Toxicology screen is positive for barbiturates.    Imaging Results (last 72 hours)     ** No results found for the last 72 hours. **        Current Facility-Administered Medications:   •  acetaminophen " (TYLENOL) tablet 650 mg, 650 mg, Oral, Q6H PRN, Rene Marie MD, 650 mg at 02/19/17 1447  •  aluminum-magnesium hydroxide-simethicone (MAALOX/MYLANTA) suspension 30 mL, 30 mL, Oral, Q6H PRN, Riccardo Mckay III, MD, 30 mL at 02/22/17 1713  •  atorvastatin (LIPITOR) tablet 40 mg, 40 mg, Oral, Nightly, Grabiel Westfall MD, 40 mg at 03/04/17 2311  •  dextromethorphan-quinidine (NUEDEXTA) capsule 1 capsule, 1 capsule, Oral, Daily, 1 capsule at 03/05/17 1316 **FOLLOWED BY** [START ON 3/6/2017] dextromethorphan-quinidine (NUEDEXTA) capsule 1 capsule, 1 capsule, Oral, Q12H, Riccardo Mckay III, MD  •  Divalproex Sodium (DEPAKOTE SPRINKLE) capsule 250 mg, 250 mg, Oral, 4x Daily, Rene Marie MD, 250 mg at 03/05/17 1315  •  donepezil (ARICEPT) tablet 10 mg, 10 mg, Oral, Nightly, Riccardo Mckay III, MD, 10 mg at 03/04/17 2311  •  HYDROcodone-acetaminophen (NORCO) 5-325 MG per tablet 1 tablet, 1 tablet, Oral, Q6H, Riccardo Mckay III, MD, 1 tablet at 03/04/17 2311  •  ibuprofen (ADVIL,MOTRIN) tablet 400 mg, 400 mg, Oral, Q6H PRN, Rene Marie MD, 400 mg at 02/20/17 1851  •  magnesium hydroxide (MILK OF MAGNESIA) suspension 2400 mg/10mL 10 mL, 10 mL, Oral, Daily PRN, Riccardo Mckay III, MD, 10 mL at 03/03/17 1618  •  memantine (NAMENDA) tablet 10 mg, 10 mg, Oral, BID, Riccardo Mckay III, MD, 10 mg at 03/05/17 0857  •  metoprolol succinate XL (TOPROL-XL) 24 hr tablet 50 mg, 50 mg, Oral, Daily, Grabiel Westfall MD, 50 mg at 03/05/17 0857  •  pantoprazole (PROTONIX) EC tablet 40 mg, 40 mg, Oral, Q AM, Grabiel Westfall MD, 40 mg at 03/05/17 0858  •  primidone (MYSOLINE) tablet 125 mg, 125 mg, Oral, Daily, Rene Marie MD, 125 mg at 03/05/17 0857  •  primidone (MYSOLINE) tablet 500 mg, 500 mg, Oral, Nightly, Rene Marie MD, 500 mg at 03/04/17 2311  •  ziprasidone (GEODON) capsule 20 mg, 20 mg, Oral, BID With Meals, Riccardo Mckay  III, MD, 20 mg at 03/05/17 0844  •  ziprasidone (GEODON) injection 20 mg, 20 mg, Intramuscular, Q6H PRN, Riccardo Mckay III, MD, 20 mg at 02/23/17 1607       ASSESSMENT:  1.  Dementia, Alzheimer type with visual disturbances.    2.  Hypertension.    3.  Hyperlipidemia.    4.  Morbidly obese.    5.  Mildly dehydrated.    6.  History of temporal arthritis.     PLAN:   CPM  PER PSYCH  STRESS ULCER/DVT PROPHYLAXIS  D/W FAMILY      Grabiel Westfall M.D.

## 2017-03-06 RX ORDER — HYDROCODONE BITARTRATE AND ACETAMINOPHEN 5; 325 MG/1; MG/1
1 TABLET ORAL EVERY 6 HOURS
Status: DISCONTINUED | OUTPATIENT
Start: 2017-03-06 | End: 2017-03-09

## 2017-03-06 RX ADMIN — DEXTROMETHORPHAN HYDROBROMIDE AND QUINIDINE SULFATE 1 CAPSULE: 20; 10 CAPSULE, GELATIN COATED ORAL at 22:56

## 2017-03-06 RX ADMIN — PRIMIDONE 500 MG: 250 TABLET ORAL at 22:55

## 2017-03-06 RX ADMIN — MEMANTINE HYDROCHLORIDE 10 MG: 10 TABLET, FILM COATED ORAL at 08:28

## 2017-03-06 RX ADMIN — ATORVASTATIN CALCIUM 40 MG: 40 TABLET, FILM COATED ORAL at 22:55

## 2017-03-06 RX ADMIN — DIVALPROEX SODIUM 250 MG: 125 CAPSULE, COATED PELLETS ORAL at 22:55

## 2017-03-06 RX ADMIN — DIVALPROEX SODIUM 250 MG: 125 CAPSULE, COATED PELLETS ORAL at 17:50

## 2017-03-06 RX ADMIN — HYDROCODONE BITARTATE AND ACETAMINOPHEN 1 TABLET: 5; 325 TABLET ORAL at 09:30

## 2017-03-06 RX ADMIN — ZIPRASIDONE HYDROCHLORIDE 20 MG: 20 CAPSULE ORAL at 08:27

## 2017-03-06 RX ADMIN — HYDROCODONE BITARTATE AND ACETAMINOPHEN 1 TABLET: 5; 325 TABLET ORAL at 16:16

## 2017-03-06 RX ADMIN — HYDROCODONE BITARTATE AND ACETAMINOPHEN 1 TABLET: 5; 325 TABLET ORAL at 22:54

## 2017-03-06 RX ADMIN — METOPROLOL SUCCINATE 50 MG: 50 TABLET, FILM COATED, EXTENDED RELEASE ORAL at 08:28

## 2017-03-06 RX ADMIN — DIVALPROEX SODIUM 250 MG: 125 CAPSULE, COATED PELLETS ORAL at 12:30

## 2017-03-06 RX ADMIN — PANTOPRAZOLE SODIUM 40 MG: 40 TABLET, DELAYED RELEASE ORAL at 08:27

## 2017-03-06 RX ADMIN — DIVALPROEX SODIUM 250 MG: 125 CAPSULE, COATED PELLETS ORAL at 08:28

## 2017-03-06 RX ADMIN — ZIPRASIDONE HYDROCHLORIDE 20 MG: 20 CAPSULE ORAL at 17:50

## 2017-03-06 RX ADMIN — DEXTROMETHORPHAN HYDROBROMIDE AND QUINIDINE SULFATE 1 CAPSULE: 20; 10 CAPSULE, GELATIN COATED ORAL at 08:29

## 2017-03-06 RX ADMIN — MEMANTINE HYDROCHLORIDE 10 MG: 10 TABLET, FILM COATED ORAL at 17:50

## 2017-03-06 RX ADMIN — PRIMIDONE 125 MG: 250 TABLET ORAL at 08:27

## 2017-03-06 RX ADMIN — DONEPEZIL HYDROCHLORIDE 10 MG: 10 TABLET, FILM COATED ORAL at 22:55

## 2017-03-06 NOTE — PROGRESS NOTES
" DAILY PROGRESS NOTE    CHIEF COMPLAINT  DOING BETTER  NO NEW ISSUES      HISTORY OF PRESENT ILLNESS: A 76-year-old  female with a history of dementia, hypertension, admitted through the emergency room to the psychiatric unit with increased agitation and combativeness. The patient is also having auditory and visual hallucinations. I am asked to follow the patient for medical problems and do the history and physical.     PHYSICAL EXAMINATION:Blood pressure 156/76, pulse 96, temperature 98.8 °F (37.1 °C), temperature source Oral, resp. rate 22, height 64\" (162.6 cm), weight 252 lb (114 kg), SpO2 94 %.    GENERAL: Alert, combative, in no respiratory distress.   HEENT: Unremarkable.    NECK: Supple.   LUNGS: Decreased breath sounds.   HEART: S1 and S2.    ABDOMEN: Soft, nontender. Bowel sounds positive.   EXTREMITIES: No edema.    NEUROLOGIC: Moves all 4 extremities. Gait and balance not checked.     DIAGNOSTIC DATA:   Lab Results (last 24 hours)     ** No results found for the last 24 hours. **        Lab Results (last 24 hours)     ** No results found for the last 24 hours. **        Lab Results (last 24 hours)     ** No results found for the last 24 hours. **        Lab Results (last 24 hours)     ** No results found for the last 24 hours. **        Lab Results (last 24 hours)     ** No results found for the last 24 hours. **        Lab Results (last 24 hours)     ** No results found for the last 24 hours. **        Lab Results (last 24 hours)     ** No results found for the last 24 hours. **        Lab Results (last 24 hours)     ** No results found for the last 24 hours. **      Labs showed a potassium of 4.3, BUN 12, creatinine 1.1. White count 6.6, hemoglobin 10.7, platelets 226,000. UA is negative. Toxicology screen is positive for barbiturates.    Imaging Results (last 72 hours)     ** No results found for the last 72 hours. **        Current Facility-Administered Medications:   •  acetaminophen " (TYLENOL) tablet 650 mg, 650 mg, Oral, Q6H PRN, Rene Marie MD, 650 mg at 17 1447  •  aluminum-magnesium hydroxide-simethicone (MAALOX/MYLANTA) suspension 30 mL, 30 mL, Oral, Q6H PRN, Riccardo Mckay III, MD, 30 mL at 17 1713  •  atorvastatin (LIPITOR) tablet 40 mg, 40 mg, Oral, Nightly, Grabiel Westfall MD, 40 mg at 17 2149  •  [] dextromethorphan-quinidine (NUEDEXTA) capsule 1 capsule, 1 capsule, Oral, Daily, 1 capsule at 17 1316 **FOLLOWED BY** dextromethorphan-quinidine (NUEDEXTA) capsule 1 capsule, 1 capsule, Oral, Q12H, Riccardo Mckay III, MD, 1 capsule at 17 0829  •  Divalproex Sodium (DEPAKOTE SPRINKLE) capsule 250 mg, 250 mg, Oral, 4x Daily, Rene Marie MD, 250 mg at 17 1230  •  donepezil (ARICEPT) tablet 10 mg, 10 mg, Oral, Nightly, Riccardo Mckay III, MD, 10 mg at 17 2149  •  HYDROcodone-acetaminophen (NORCO) 5-325 MG per tablet 1 tablet, 1 tablet, Oral, Q6H, Riccardo Mckay III, MD, 1 tablet at 17 0930  •  ibuprofen (ADVIL,MOTRIN) tablet 400 mg, 400 mg, Oral, Q6H PRN, Rene Marie MD, 400 mg at 17 1851  •  magnesium hydroxide (MILK OF MAGNESIA) suspension 2400 mg/10mL 10 mL, 10 mL, Oral, Daily PRN, Riccardo Mckay III, MD, 10 mL at 17 1618  •  memantine (NAMENDA) tablet 10 mg, 10 mg, Oral, BID, Riccardo Mckay III, MD, 10 mg at 17 0828  •  metoprolol succinate XL (TOPROL-XL) 24 hr tablet 50 mg, 50 mg, Oral, Daily, Grabiel Westfall MD, 50 mg at 17 0828  •  pantoprazole (PROTONIX) EC tablet 40 mg, 40 mg, Oral, Q AM, Grabiel Westfall MD, 40 mg at 17 08  •  primidone (MYSOLINE) tablet 125 mg, 125 mg, Oral, Daily, Rene Marie MD, 125 mg at 17  •  primidone (MYSOLINE) tablet 500 mg, 500 mg, Oral, Nightly, Rene Marie MD, 500 mg at 174  •  ziprasidone (GEODON) capsule 20 mg, 20 mg, Oral, BID With Meals, Riccardo  Danni Mckay III, MD, 20 mg at 03/06/17 2872  •  ziprasidone (GEODON) injection 20 mg, 20 mg, Intramuscular, Q6H PRN, Riccardo Mckay III, MD, 20 mg at 02/23/17 160       ASSESSMENT:  1.  Dementia, Alzheimer type with visual disturbances.    2.  Hypertension.    3.  Hyperlipidemia.    4.  Morbidly obese.    5.  Mildly dehydrated.    6.  History of temporal arthritis.     PLAN:   CPM  PER PSYCH  STRESS ULCER/DVT PROPHYLAXIS  D/W FAMILY      Grabiel Westfall M.D.

## 2017-03-06 NOTE — PLAN OF CARE
"Problem:  Patient Care Overview (Adult)  Goal: Plan of Care Review  Outcome: Ongoing (interventions implemented as appropriate)    03/06/17 0930 03/06/17 1555   Coping/Psychosocial Response Interventions   Plan Of Care Reviewed With --  patient   Coping/Psychosocial   Patient Agreement with Plan of Care refuses to participate  (confused minimallly cooperative with assessment ) --    Patient Care Overview   Progress --  no change   Outcome Evaluation   Outcome Summary/Follow up Plan --  Patient alert to self. She at first stated she was \"feeling pretty well\", but then became irritable/labile and would not fully cooperate with the rest of my assessment questions, stating \"do you really  Have to ask me these dumb questions?\". She has remained cooperative with medications. She also still intermittently yells out, but this does appear to occur less often.  Will continue to monitor and provide support.          Goal: Interdisciplinary Rounds/Family Conference  Outcome: Ongoing (interventions implemented as appropriate)  Goal: Individualization and Mutuality  Outcome: Ongoing (interventions implemented as appropriate)  Goal: Discharge Needs Assessment  Outcome: Ongoing (interventions implemented as appropriate)    Problem:  Overarching Goals  Goal: Adheres to Safety Considerations for Self and Others  Outcome: Ongoing (interventions implemented as appropriate)  Intervention: Develop/maintain Individualized Safety Plan    02/27/17 0930 03/06/17 0136 03/06/17 0930   Safety   Safety Measures --  --  safety rounds completed;suicide assessment completed   Mental Health Homicide Risk   Homicidal Ideation --  --  (unable to assess pt not coop with asst/confused)   Willingness to Contact Staff Member if Feeling Like Hurting Others --  yes --    Provide Emotional/Physical Safety   Suicidal Ideation --  --  (unable to assess pt not coop with asst is confused)   Suicide Plan/Availability unable to assess --  --    Willingness to " Contact Staff Member if Feeling Like Hurting Self --  yes --          Goal: Optimized Coping Skills in Response to Life Stressors  Outcome: Ongoing (interventions implemented as appropriate)  Intervention: Promote Effective Coping Strategies    03/06/17 0930   Coping Strategies   Supportive Measures active listening utilized;positive reinforcement provided;verbalization of feelings encouraged         Goal: Develops/Participates in Therapeutic [place] to Support Successful Transition  Outcome: Ongoing (interventions implemented as appropriate)  Intervention: Foster Therapeutic [place]    03/06/17 0930   Coping Strategies   Trust Relationship/Rapport care explained;choices provided;emotional support provided;empathic listening provided;questions answered;questions encouraged;reassurance provided;thoughts/feelings acknowledged       Intervention: Mutually Develop Transition Plan    03/06/17 0930   OTHER   Transition Support crisis management plan promoted           Problem: Alteration in Orientation  Goal: Treatment Goal: Demonstrate a reduction of confusion and improved orientation to person, place, time and/or situation upon discharge, according to optimum baseline/ability  Outcome: Ongoing (interventions implemented as appropriate)  Goal: Interact with staff daily  Outcome: Ongoing (interventions implemented as appropriate)  Goal: Express concerns related to confused thinking related to:  Outcome: Ongoing (interventions implemented as appropriate)  Goal: Allow medical examinations, as recommended  Outcome: Ongoing (interventions implemented as appropriate)  Goal: Cooperate with recommended testing/procedures  Outcome: Ongoing (interventions implemented as appropriate)  Goal: Attend and participate in unit activities, including therapeutic, recreational, and educational groups  Outcome: Ongoing (interventions implemented as appropriate)  Goal: Complete daily ADLs, including personal hygiene independently, as  able  Outcome: Ongoing (interventions implemented as appropriate)    Problem: Alteration in Thoughts and Perception  Goal: Treatment Goal: Gain control of psychotic behaviors/thinking, reduce/eliminate presenting symptoms and demonstrate improved reality functioning upon discharge  Outcome: Ongoing (interventions implemented as appropriate)  Goal: Verbalize thoughts and feelings associated with:  Outcome: Ongoing (interventions implemented as appropriate)  Goal: Refrain from acting on delusional thinking/internal stimuli  Outcome: Ongoing (interventions implemented as appropriate)  Goal: Agree to be compliant with medication regime, as prescribed and report medication side effects  Outcome: Ongoing (interventions implemented as appropriate)  Goal: Attend and participate in unit activities, including therapeutic, recreational, and educational groups  Outcome: Ongoing (interventions implemented as appropriate)  Goal: Complete daily ADLs, including personal hygiene independently, as able  Outcome: Ongoing (interventions implemented as appropriate)    Problem: Risk for Elopement/Wandering  Goal: Monitor Patient to prevent elopement/wandering  Outcome: Ongoing (interventions implemented as appropriate)    03/03/17 2019   OTHER   Monitor Patient to Prevent Elopement/Wandering no elopement issues this shift         Problem: Fall Risk (Adult)  Intervention: Monitor/Assist with Self Care    03/03/17 1930 03/06/17 0930   Activity   Activity Type activity adjusted per tolerance --    Activity Level of Assistance --  assistance, 2 people   Monitor/Assist with Self Care   Ambulation --  4-->completely dependent   Transferring --  4-->completely dependent   Toileting --  4-->completely dependent   Bathing --  4-->completely dependent   Dressing --  4-->completely dependent   Eating --  2-->assistive person   Communication --  2-->difficulty understanding (not related to language barrier)   Swallowing (if score 2 or more for any  item, consult Rehab Services) --  0-->swallows foods/liquids without difficulty   Musculoskeletal Interventions   Self-Care Promotion --  meal setup provided;BADL personal routines maintained;BADL personal objects within reach;independence encouraged       Intervention: Reduce Risk/Promote Restraint Free Environment    03/06/17 0700 03/06/17 0930 03/06/17 1500   Safety Interventions   Safety/Security Measures bed alarm set --  --    Environmental Safety Modification --  --  clutter free environment maintained;room organization consistent   Restraint Interventions   Safety Promotion/Fall Prevention --  safety round/check completed;fall prevention program maintained;nonskid shoes/slippers when out of bed --          Goal: Absence of Falls  Outcome: Ongoing (interventions implemented as appropriate)    03/06/17 1555   Fall Risk (Adult)   Absence of Falls making progress toward outcome         Problem: Skin Integrity Impairment, Risk/Actual (Adult)  Intervention: Promote/Optimize Nutrition    03/01/17 2105 03/05/17 1100   Nutrition Interventions   Oral Nutrition Promotion rest periods promoted --    Hygiene Care Assistance   Oral Care --  teeth brushed       Intervention: Prevent/Manage Excess Moisture    03/06/17 0650   Hygiene Care Assistance   Perineal Care perianal area cleansed;protective cream applied;absorbent pad changed   Hygiene Care   Bathing/Skin Care incontinence care       Intervention: Prevent/Minimize Sheer/Friction Injuries    03/06/17 0136 03/06/17 0930   Skin Interventions   Pressure Reduction Techniques frequent weight shift encouraged;pressure points protected;weight shift assistance provided --    Pressure Reduction Devices --  specialty bed utilized  (SCD's)   Positioning   Positioning/Transfer Devices --  pillows         Goal: Skin Integrity/Wound Healing  Outcome: Ongoing (interventions implemented as appropriate)    03/06/17 1555   Skin Integrity Impairment, Risk/Actual (Adult)   Skin  Integrity/Wound Healing making progress toward outcome

## 2017-03-06 NOTE — PLAN OF CARE
Problem:  Patient Care Overview (Adult)  Goal: Plan of Care Review  Outcome: Ongoing (interventions implemented as appropriate)    03/06/17 1531   Patient Care Overview   Progress no change   Outcome Evaluation   Outcome Summary/Follow up Plan Pt began incresed Nudexta to BID today. Per treatment team this morning, if this medication adjustment does not improve pt's response, may need to approach palliative again with family. Pt remains overall improved with intensity of yelling out and physical resisitance to hands on care. SW will wait to see effects of Nudexta increase before referring. SW will continue to follow.

## 2017-03-06 NOTE — PROGRESS NOTES
Up in chair more, still crying out for seemingly no reason, occasionally irritable    Will look for further progress w increased Nuedexta dose

## 2017-03-06 NOTE — PLAN OF CARE
Problem:  Patient Care Overview (Adult)  Goal: Plan of Care Review  Outcome: Ongoing (interventions implemented as appropriate)    03/06/17 0136 03/06/17 0328   Coping/Psychosocial   Patient Agreement with Plan of Care agrees --    Patient Care Overview   Progress --  no change   Outcome Evaluation   Outcome Summary/Follow up Plan --  Pt was up in chair at start of shift. Pt was compliant with medications and denied pain. Pt was yelling out for part of shift. Will continue to monitor.         Problem:  Overarching Goals  Goal: Adheres to Safety Considerations for Self and Others  Outcome: Ongoing (interventions implemented as appropriate)  Goal: Optimized Coping Skills in Response to Life Stressors  Outcome: Ongoing (interventions implemented as appropriate)  Goal: Develops/Participates in Therapeutic [place] to Support Successful Transition  Outcome: Ongoing (interventions implemented as appropriate)

## 2017-03-07 PROCEDURE — 97110 THERAPEUTIC EXERCISES: CPT

## 2017-03-07 RX ADMIN — ZIPRASIDONE HYDROCHLORIDE 20 MG: 20 CAPSULE ORAL at 17:23

## 2017-03-07 RX ADMIN — MEMANTINE HYDROCHLORIDE 10 MG: 10 TABLET, FILM COATED ORAL at 11:18

## 2017-03-07 RX ADMIN — METOPROLOL SUCCINATE 50 MG: 50 TABLET, FILM COATED, EXTENDED RELEASE ORAL at 11:18

## 2017-03-07 RX ADMIN — PRIMIDONE 125 MG: 250 TABLET ORAL at 11:20

## 2017-03-07 RX ADMIN — HYDROCODONE BITARTATE AND ACETAMINOPHEN 1 TABLET: 5; 325 TABLET ORAL at 17:23

## 2017-03-07 RX ADMIN — DIVALPROEX SODIUM 250 MG: 125 CAPSULE, COATED PELLETS ORAL at 11:19

## 2017-03-07 RX ADMIN — ZIPRASIDONE HYDROCHLORIDE 20 MG: 20 CAPSULE ORAL at 11:18

## 2017-03-07 RX ADMIN — DIVALPROEX SODIUM 250 MG: 125 CAPSULE, COATED PELLETS ORAL at 17:23

## 2017-03-07 RX ADMIN — DEXTROMETHORPHAN HYDROBROMIDE AND QUINIDINE SULFATE 1 CAPSULE: 20; 10 CAPSULE, GELATIN COATED ORAL at 11:31

## 2017-03-07 RX ADMIN — HYDROCODONE BITARTATE AND ACETAMINOPHEN 1 TABLET: 5; 325 TABLET ORAL at 11:17

## 2017-03-07 RX ADMIN — PANTOPRAZOLE SODIUM 40 MG: 40 TABLET, DELAYED RELEASE ORAL at 11:32

## 2017-03-07 NOTE — PROGRESS NOTES
Inpatient Rehabilitation - Physical Therapy Treatment Note  [facility]     Patient Name: Mnadi Warner  : 1940  MRN: [id number]  Today's Date: 3/7/2017  Onset of Illness/Injury or Date of Surgery Date: 17  Date of Referral to PT: 17  Referring Physician: Woody    Admit Date: 2017    Visit Dx:  No diagnosis found.  Patient Active Problem List   Diagnosis   • Dementia with behavioral disturbance               Adult Rehabilitation Note       17 1541 17 1625       Rehab Assessment/Intervention    Discipline physical therapy assistant  -LB      Document Type therapy note (daily note)  -LB      Subjective Information agree to therapy  -LB      Patient Effort, Rehab Treatment poor  -LB      Treatment Not Performed  patient/family declined treatment  -LBA     Treatment Not Performed, Comment  pt clearly said no to therapy and did seem able to participate in therapy today  -LBA     Precautions/Limitations fall precautions  -LB      Recorded by [LB] Roro Goodwin PTA [LBA] Roberta Jarquin, PT     Pain Assessment    Pain Assessment No/denies pain  -LB      Recorded by [LB] Roro Goodwin PTA      Cognitive Assessment/Intervention    Personal Safety Interventions fall prevention program maintained;muscle strengthening facilitated;supervised activity  -LB      Recorded by [LB] Roro Goodwin PTA      Bed Mobility, Assessment/Treatment    Bed Mob, Supine to Sit, [place] dependent (less than 25% patient effort);2 person assist required  -LB      Bed Mob, Sit to Supine, [place] dependent (less than 25% patient effort);2 person assist required  -LB      Recorded by [LB] Roro Goodwin PTA      Transfer Assessment/Treatment    Transfer, Comment not safe for tsfs training as pnt currently is giving little to no effort to assist w functional activities such as bed mob, and sitting balance.  -LB      Recorded by [LB] Roro Goodwin PTA      Gait Assessment/Treatment     Gait, [place] Level not appropriate to assess  -LB      Recorded by [LB] Roro Goodwin PTA      Balance Skills Training    Sitting-Level of Assistance Maximum assistance;x2  -LB      Sitting-Balance Support Right upper extremity supported;Left upper extremity supported;Feet unsupported  -LB      Sitting-Balance Activities --   pnt only occas attempted to hold balance.   -LB      Recorded by [LB] Roro Goodwin PTA      Therapy Exercises    Bilateral Lower Extremities PROM:;5 reps;supine   pnt did not attempt to assist w ex BLE's  -LB      Recorded by [LB] Roro Goodwin PTA      Positioning and Restraints    Post Treatment Position bed  -LB      In Bed supine;side rails up x3;exit alarm on  -LB      Recorded by [LB] Roro Goodwin PTA        User Key  (r) = Recorded By, (t) = Taken By, (c) = Cosigned By    Initials Name Effective Dates    LBCINDY Jarquin, PT 10/06/15 -     LB Roro Goodwin PTA 02/18/16 -                 IP PT Goals       03/07/17 1550 02/27/17 1439       Bed Mobility PT LTG    Bed Mobility PT LTG, Date Established  02/27/17  -LB     Bed Mobility PT LTG, Time to Achieve  1 wk  -LB     Bed Mobility PT LTG, Activity Type  supine to sit/sit to supine  -LB     Bed Mobility PT LTG, [place] Level  maximum assist (25% patient effort)  -LB     Bed Mobility PT LTG, Date Goal Reviewed 03/07/17  -LBA 02/27/17  -LB     Bed Mobility PT LTG, Outcome goal ongoing  -LBA goal revised  -LB     Transfer Training PT LTG    Transfer Training PT LTG, Activity Type  sit to stand/stand to sit  -LB     Transfer Training PT LTG, [place] Level  maximum assist (25% patient effort);2 person assist required  -LB     Transfer Training PT  LTG, Date Goal Reviewed 03/07/17  -LBA 02/27/17  -LB     Transfer Training PT LTG, Outcome goal not met  -LBA goal revised  -LB     Gait Training PT LTG    Gait Training Goal PT LTG, Date Goal Reviewed  02/27/17  -LB     Gait Training Goal PT LTG, Outcome   goal no longer appropriate  -LB       User Key  (r) = Recorded By, (t) = Taken By, (c) = Cosigned By    Initials Name Provider Type    LB Roberta Jarquin, PT Physical Therapist    LBCINDY Goodwin, [place] Physical Therapy Assistant          Physical Therapy Education     Title: PT OT SLP Therapies (Active)     Topic: Physical Therapy (Active)     Point: Mobility training (Active)    Learning Progress Summary    Learner Readiness Method Response Comment Documented by Status   Patient Nonacceptance E NR Pnt kept eyes closed for the majority of the session.  03/07/17 1549 Active    Nonacceptance E NR,NL  LB1 02/27/17 1436 Active               Point: Precautions (Done)    Learning Progress Summary    Learner Readiness Method Response Comment Documented by Status   Patient Acceptance TB,E VU Daughters interested in learning/assisting pt but pt resistant.  02/23/17 1548 Done    Acceptance E VU   02/20/17 1915 Done    Nonacceptance E NR   02/20/17 0859 Active    Nonacceptance E,D NR,NL  MD 02/19/17 1107 Active   Family Acceptance TB,E VU Daughters interested in learning/assisting pt but pt resistant.  02/23/17 1548 Done    Acceptance E VU  RP 02/20/17 1915 Done                      User Key     Initials Effective Dates Name Provider Type Discipline     04/24/15 -  Anni Gastelum, PT Physical Therapist PT    [facility] 10/06/15 -  Roberta Jarquin, PT Physical Therapist PT     02/07/17 -  Roro Patterson, PT Physical Therapist PT     02/18/16 -  Roro Goodwin PTA Physical Therapy Assistant PT    MD 12/01/15 -  Arminda Howard, PT Physical Therapist PT     06/16/16 -  Boby Owens RN Registered Nurse Nurse                    PT Recommendation and Plan  Anticipated Discharge Disposition: skilled nursing facility  Planned Therapy Interventions: bed mobility training, gait training, patient/family education, transfer training  PT Frequency: 3-5 times/wk  Plan of Care Review  Plan Of Care Reviewed With: patient  Outcome  Summary/Follow up Plan: pnt kept her eyes closed for majority of txment session. Minimal participation w sitting balance. Unable to get pnt to attempt to reach, or even correct her balance.          Outcome Measures       03/07/17 1500          How much help from another person do you currently need...    Turning from your back to your side while in flat bed without using bedrails? 1  -LB      Moving from lying on back to sitting on the side of a flat bed without bedrails? 1  -LB      Moving to and from a bed to a chair (including a wheelchair)? 1  -LB      Standing up from a chair using your arms (e.g., wheelchair, bedside chair)? 1  -LB      Climbing 3-5 steps with a railing? 1  -LB      To walk in hospital room? 1  -LB      AM-PAC 6 Clicks Score 6  -LB      Functional Assessment    Outcome Measure Options AM-PAC 6 Clicks Basic Mobility (PT)  -LB        User Key  (r) = Recorded By, (t) = Taken By, (c) = Cosigned By    Initials Name Provider Type    WILFRIDO Goodwin PTA Physical Therapy Assistant           Time Calculation:         PT Charges       03/07/17 1553          Time Calculation    Start Time 1520  -LB      Stop Time 1535  -LB      Time Calculation (min) 15 min  -LB        User Key  (r) = Recorded By, (t) = Taken By, (c) = Cosigned By    Initials Name Provider Type    WILFRIDO Goodwin PTA Physical Therapy Assistant          Therapy Charges for Today     Code Description Service Date Service Provider Modifiers Qty    30348962074 HC PT THER PROC EA 15 MIN 3/7/2017 Roro Goodwin PTA GP 1          PT G-Codes  Outcome Measure Options: AM-PAC 6 Clicks Basic Mobility (PT)    Roro Goodwin PTA  3/7/2017

## 2017-03-07 NOTE — PLAN OF CARE
Problem:  Patient Care Overview (Adult)  Goal: Plan of Care Review  Outcome: Ongoing (interventions implemented as appropriate)    03/07/17 0631   Coping/Psychosocial Response Interventions   Plan Of Care Reviewed With patient   Coping/Psychosocial   Patient Agreement with Plan of Care agrees   Patient Care Overview   Progress no change   Outcome Evaluation   Outcome Summary/Follow up Plan The pt was adherent to HS meds w/o hesistation. She was not fully cooperative w/ assessment, asking this RN questions back that he was asking her. She had one period of talking/yelling aloud in her room. Yelling out seems less frequent and at a lower intensity than previous week. She does remain guarded and suspicious.

## 2017-03-07 NOTE — PROGRESS NOTES
" DAILY PROGRESS NOTE    CHIEF COMPLAINT  DOING SAME        HISTORY OF PRESENT ILLNESS: A 76-year-old  female with a history of dementia, hypertension, admitted through the emergency room to the psychiatric unit with increased agitation and combativeness. The patient is also having auditory and visual hallucinations. I am asked to follow the patient for medical problems and do the history and physical.     PHYSICAL EXAMINATION:Blood pressure 128/68, pulse 69, temperature 98.8 °F (37.1 °C), temperature source Oral, resp. rate 20, height 64\" (162.6 cm), weight 252 lb (114 kg), SpO2 96 %.    GENERAL: Alert, combative, in no respiratory distress.   HEENT: Unremarkable.    NECK: Supple.   LUNGS: Decreased breath sounds.   HEART: S1 and S2.    ABDOMEN: Soft, nontender. Bowel sounds positive.   EXTREMITIES: No edema.    NEUROLOGIC: Moves all 4 extremities. Gait and balance not checked.     DIAGNOSTIC DATA:   Lab Results (last 24 hours)     ** No results found for the last 24 hours. **        Lab Results (last 24 hours)     ** No results found for the last 24 hours. **        Lab Results (last 24 hours)     ** No results found for the last 24 hours. **        Lab Results (last 24 hours)     ** No results found for the last 24 hours. **        Lab Results (last 24 hours)     ** No results found for the last 24 hours. **        Lab Results (last 24 hours)     ** No results found for the last 24 hours. **        Lab Results (last 24 hours)     ** No results found for the last 24 hours. **        Lab Results (last 24 hours)     ** No results found for the last 24 hours. **      Labs showed a potassium of 4.3, BUN 12, creatinine 1.1. White count 6.6, hemoglobin 10.7, platelets 226,000. UA is negative. Toxicology screen is positive for barbiturates.    Imaging Results (last 72 hours)     ** No results found for the last 72 hours. **        Current Facility-Administered Medications:   •  acetaminophen (TYLENOL) " tablet 650 mg, 650 mg, Oral, Q6H PRN, Rene Marie MD, 650 mg at 17 1447  •  aluminum-magnesium hydroxide-simethicone (MAALOX/MYLANTA) suspension 30 mL, 30 mL, Oral, Q6H PRN, Riccardo Mckay III, MD, 30 mL at 17 1713  •  atorvastatin (LIPITOR) tablet 40 mg, 40 mg, Oral, Nightly, Grabiel Westfall MD, 40 mg at 17 2255  •  [] dextromethorphan-quinidine (NUEDEXTA) capsule 1 capsule, 1 capsule, Oral, Daily, 1 capsule at 17 1316 **FOLLOWED BY** dextromethorphan-quinidine (NUEDEXTA) capsule 1 capsule, 1 capsule, Oral, Q12H, Riccardo Mckay III, MD, 1 capsule at 17 1131  •  Divalproex Sodium (DEPAKOTE SPRINKLE) capsule 250 mg, 250 mg, Oral, 4x Daily, Rene Marie MD, 250 mg at 17 1119  •  donepezil (ARICEPT) tablet 10 mg, 10 mg, Oral, Nightly, Riccardo Mckay III, MD, 10 mg at 17 2255  •  HYDROcodone-acetaminophen (NORCO) 5-325 MG per tablet 1 tablet, 1 tablet, Oral, Q6H, Riccardo Mckay III, MD, 1 tablet at 17 1117  •  ibuprofen (ADVIL,MOTRIN) tablet 400 mg, 400 mg, Oral, Q6H PRN, Rene Marie MD, 400 mg at 17 1851  •  magnesium hydroxide (MILK OF MAGNESIA) suspension 2400 mg/10mL 10 mL, 10 mL, Oral, Daily PRN, Riccardo Mckay III, MD, 10 mL at 17 1618  •  memantine (NAMENDA) tablet 10 mg, 10 mg, Oral, BID, Riccardo Mckay III, MD, 10 mg at 17 1118  •  metoprolol succinate XL (TOPROL-XL) 24 hr tablet 50 mg, 50 mg, Oral, Daily, Grabiel Westfall MD, 50 mg at 17 1118  •  pantoprazole (PROTONIX) EC tablet 40 mg, 40 mg, Oral, Q AM, Grabiel Westfall MD, 40 mg at 17 1132  •  primidone (MYSOLINE) tablet 125 mg, 125 mg, Oral, Daily, Rene Marie MD, 125 mg at 17 1120  •  primidone (MYSOLINE) tablet 500 mg, 500 mg, Oral, Nightly, Rene Marie MD, 500 mg at 17 2250  •  ziprasidone (GEODON) capsule 20 mg, 20 mg, Oral, BID With Meals, Riccardo Razo  Woody THOMPSON MD, 20 mg at 03/07/17 1118  •  ziprasidone (GEODON) injection 20 mg, 20 mg, Intramuscular, Q6H PRN, Riccardo Razo Woody THOMPSON MD, 20 mg at 02/23/17 1601       ASSESSMENT:  1.  Dementia, Alzheimer type with visual disturbances.    2.  Hypertension.    3.  Hyperlipidemia.    4.  Morbidly obese.    5.  Mildly dehydrated.    6.  History of temporal arthritis.     PLAN:   CPM  PER PSYCH  STRESS ULCER/DVT PROPHYLAXIS  D/W FAMILY  WILL FOLLOW      Grabiel Westfall M.D.

## 2017-03-07 NOTE — PROGRESS NOTES
Episodes of yelling are less frequent., pt more compliant w meds tho still occasionally resistant to care    Continuing current tx, hopung to see improvement w increased Neudexta dose

## 2017-03-07 NOTE — PLAN OF CARE
Problem: Inpatient Physical Therapy  Goal: Bed Mobility Goal LTG- PT  Outcome: Ongoing (interventions implemented as appropriate)    02/19/17 1103 02/27/17 1439 03/07/17 1550   Bed Mobility PT LTG   Bed Mobility PT LTG, Date Established --  02/27/17 --    Bed Mobility PT LTG, Time to Achieve --  1 wk --    Bed Mobility PT LTG, Activity Type --  supine to sit/sit to supine --    Bed Mobility PT LTG, [place] Level --  maximum assist (25% patient effort) --    Bed Mobility PT Goal LTG, Assist Device bed rails --  --    Bed Mobility PT LTG, Date Goal Reviewed --  --  03/07/17   Bed Mobility PT LTG, Outcome --  --  goal ongoing       Goal: Transfer Training Goal 1 LTG- PT  Outcome: Ongoing (interventions implemented as appropriate)    02/19/17 1103 02/27/17 1439 03/07/17 1550   Transfer Training PT LTG   Transfer Training PT LTG, Date Established 02/19/17 --  --    Transfer Training PT LTG, Time to Achieve 2 wks --  --    Transfer Training PT LTG, Activity Type --  sit to stand/stand to sit --    Transfer Training PT LTG, [place] Level --  maximum assist (25% patient effort);2 person assist required --    Transfer Training PT LTG, Assist Device walker, rolling --  --    Transfer Training PT LTG, Date Goal Reviewed --  --  03/07/17   Transfer Training PT LTG, Outcome --  --  goal not met

## 2017-03-08 RX ADMIN — ZIPRASIDONE HYDROCHLORIDE 20 MG: 20 CAPSULE ORAL at 11:46

## 2017-03-08 RX ADMIN — DONEPEZIL HYDROCHLORIDE 10 MG: 10 TABLET, FILM COATED ORAL at 20:38

## 2017-03-08 RX ADMIN — PANTOPRAZOLE SODIUM 40 MG: 40 TABLET, DELAYED RELEASE ORAL at 11:46

## 2017-03-08 RX ADMIN — DIVALPROEX SODIUM 250 MG: 125 CAPSULE, COATED PELLETS ORAL at 11:46

## 2017-03-08 RX ADMIN — MEMANTINE HYDROCHLORIDE 10 MG: 10 TABLET, FILM COATED ORAL at 18:12

## 2017-03-08 RX ADMIN — DEXTROMETHORPHAN HYDROBROMIDE AND QUINIDINE SULFATE 1 CAPSULE: 20; 10 CAPSULE, GELATIN COATED ORAL at 11:46

## 2017-03-08 RX ADMIN — DIVALPROEX SODIUM 250 MG: 125 CAPSULE, COATED PELLETS ORAL at 18:12

## 2017-03-08 RX ADMIN — ZIPRASIDONE HYDROCHLORIDE 20 MG: 20 CAPSULE ORAL at 18:12

## 2017-03-08 RX ADMIN — ATORVASTATIN CALCIUM 40 MG: 40 TABLET, FILM COATED ORAL at 20:37

## 2017-03-08 RX ADMIN — DIVALPROEX SODIUM 250 MG: 125 CAPSULE, COATED PELLETS ORAL at 20:38

## 2017-03-08 RX ADMIN — PRIMIDONE 500 MG: 250 TABLET ORAL at 20:36

## 2017-03-08 RX ADMIN — HYDROCODONE BITARTATE AND ACETAMINOPHEN 1 TABLET: 5; 325 TABLET ORAL at 11:45

## 2017-03-08 RX ADMIN — HYDROCODONE BITARTATE AND ACETAMINOPHEN 1 TABLET: 5; 325 TABLET ORAL at 18:12

## 2017-03-08 RX ADMIN — METOPROLOL SUCCINATE 50 MG: 50 TABLET, FILM COATED, EXTENDED RELEASE ORAL at 11:47

## 2017-03-08 RX ADMIN — PRIMIDONE 125 MG: 250 TABLET ORAL at 11:49

## 2017-03-08 RX ADMIN — MEMANTINE HYDROCHLORIDE 10 MG: 10 TABLET, FILM COATED ORAL at 11:47

## 2017-03-08 RX ADMIN — DEXTROMETHORPHAN HYDROBROMIDE AND QUINIDINE SULFATE 1 CAPSULE: 20; 10 CAPSULE, GELATIN COATED ORAL at 20:37

## 2017-03-08 NOTE — NURSING NOTE
PT is confused but, more calm and cooperative compared to last week's behavior.. Compliant with meds. No verbal abuse to staff. Some short lived crying and yelling out episodes.

## 2017-03-08 NOTE — PLAN OF CARE
Problem:  Patient Care Overview (Adult)  Goal: Plan of Care Review  Outcome: Ongoing (interventions implemented as appropriate)    03/08/17 1240   Coping/Psychosocial Response Interventions   Plan Of Care Reviewed With patient   Coping/Psychosocial   Patient Agreement with Plan of Care agrees   Patient Care Overview   Progress no change   Outcome Evaluation   Outcome Summary/Follow up Plan Referrals begun yesterday for skilled facility placement. Advocating for initially to be skilled for therapy due to pt's prior level of functioning before coming to the hospital was able to assist with transfers and ambulatory with walker and assist of 1. Pt continues to be compliant with most meds and hands on care now. Agitation is limited to verbal expression at this time. Pt still not wanting to get out of bed. Facilities reaching out to family today to [employer] touring. SW will continue to follow

## 2017-03-08 NOTE — PROGRESS NOTES
Seems to be sustaining progress, quieter, more cooperative    Beginning referrrals for disposition

## 2017-03-08 NOTE — SIGNIFICANT NOTE
03/08/17 1142   Rehab Treatment   Discipline physical therapist   Treatment Not Performed (pt yelling out this AM, nsg attending to pt. not appropriate at this time. )   Recommendation   PT - Next Appointment 03/09/17

## 2017-03-08 NOTE — PLAN OF CARE
Problem: [facility] Patient Care Overview (Adult)  Goal: Interdisciplinary Rounds/Family Conference  Outcome: Ongoing (interventions implemented as appropriate)    03/08/17 0959   Interdisciplinary Rounds/Family Conf   Summary Treatment team met to review pt's plan of care. Pt is not aggressive with hands on care but does at times refuse care. Pt more compliant with medications and has decreased periods of yelling out. SW initiated SNF referrals yesterday and awaiting responses.    Participants art therapy;;nursing;pharmacy;social work;psychiatrist      Patient/Guardian Signature: __________________________________             Psychiatrist Signature: ______________________________________             Therapist Signature: ________________________________________              Nurse Signature: ___________________________________________              Staff Signature: ____________________________________________             Staff Signature: ____________________________________________              Staff Signature: ____________________________________________              Staff Signature:                                                                                                        Problem: Alteration in Orientation  Goal: Treatment Goal: Demonstrate a reduction of confusion and improved orientation to person, place, time and/or situation upon discharge, according to optimum baseline/ability  Outcome: Ongoing (interventions implemented as appropriate)  Goal: Interact with staff daily  Outcome: Ongoing (interventions implemented as appropriate)  Goal: Express concerns related to confused thinking related to:  Outcome: Ongoing (interventions implemented as appropriate)  Goal: Allow medical examinations, as recommended  Outcome: Ongoing (interventions implemented as appropriate)  Goal: Cooperate with recommended testing/procedures  Outcome: Ongoing (interventions implemented as appropriate)  Goal: Attend and  participate in unit activities, including therapeutic, recreational, and educational groups  Outcome: Ongoing (interventions implemented as appropriate)  Goal: Complete daily ADLs, including personal hygiene independently, as able  Outcome: Ongoing (interventions implemented as appropriate)    Problem: Alteration in Thoughts and Perception  Goal: Treatment Goal: Gain control of psychotic behaviors/thinking, reduce/eliminate presenting symptoms and demonstrate improved reality functioning upon discharge  Outcome: Ongoing (interventions implemented as appropriate)  Goal: Verbalize thoughts and feelings associated with:  Outcome: Ongoing (interventions implemented as appropriate)  Goal: Refrain from acting on delusional thinking/internal stimuli  Outcome: Ongoing (interventions implemented as appropriate)  Goal: Agree to be compliant with medication regime, as prescribed and report medication side effects  Outcome: Ongoing (interventions implemented as appropriate)  Goal: Complete daily ADLs, including personal hygiene independently, as able  Outcome: Ongoing (interventions implemented as appropriate)    Problem: Risk for Elopement/Wandering  Goal: Monitor Patient to prevent elopement/wandering  Outcome: Ongoing (interventions implemented as appropriate)

## 2017-03-08 NOTE — PLAN OF CARE
Problem: [facility] Patient Care Overview (Adult)  Goal: Plan of Care Review  Outcome: Ongoing (interventions implemented as appropriate)    Problem: BH Overarching Goals  Goal: Adheres to Safety Considerations for Self and Others  Outcome: Ongoing (interventions implemented as appropriate)  Goal: Optimized Coping Skills in Response to Life Stressors  Outcome: Ongoing (interventions implemented as appropriate)  Goal: Develops/Participates in Therapeutic [place] to Support Successful Transition  Outcome: Ongoing (interventions implemented as appropriate)

## 2017-03-08 NOTE — PROGRESS NOTES
" DAILY PROGRESS NOTE    CHIEF COMPLAINT  DOING SAME        HISTORY OF PRESENT ILLNESS: A 76-year-old  female with a history of dementia, hypertension, admitted through the emergency room to the psychiatric unit with increased agitation and combativeness. The patient is also having auditory and visual hallucinations. I am asked to follow the patient for medical problems and do the history and physical.     PHYSICAL EXAMINATION:Blood pressure 136/63, pulse 71, temperature 98.7 °F (37.1 °C), temperature source Oral, resp. rate 18, height 64\" (162.6 cm), weight 252 lb (114 kg), SpO2 97 %.    GENERAL: Alert, combative, in no respiratory distress.   HEENT: Unremarkable.    NECK: Supple.   LUNGS: Decreased breath sounds.   HEART: S1 and S2.    ABDOMEN: Soft, nontender. Bowel sounds positive.   EXTREMITIES: No edema.    NEUROLOGIC: Moves all 4 extremities. Gait and balance not checked.     DIAGNOSTIC DATA:   Lab Results (last 24 hours)     ** No results found for the last 24 hours. **        Lab Results (last 24 hours)     ** No results found for the last 24 hours. **        Lab Results (last 24 hours)     ** No results found for the last 24 hours. **        Lab Results (last 24 hours)     ** No results found for the last 24 hours. **        Lab Results (last 24 hours)     ** No results found for the last 24 hours. **        Lab Results (last 24 hours)     ** No results found for the last 24 hours. **        Lab Results (last 24 hours)     ** No results found for the last 24 hours. **        Lab Results (last 24 hours)     ** No results found for the last 24 hours. **      Labs showed a potassium of 4.3, BUN 12, creatinine 1.1. White count 6.6, hemoglobin 10.7, platelets 226,000. UA is negative. Toxicology screen is positive for barbiturates.    Imaging Results (last 72 hours)     ** No results found for the last 72 hours. **        Current Facility-Administered Medications:   •  acetaminophen (TYLENOL) " tablet 650 mg, 650 mg, Oral, Q6H PRN, Rene Marie MD, 650 mg at 17 1447  •  aluminum-magnesium hydroxide-simethicone (MAALOX/MYLANTA) suspension 30 mL, 30 mL, Oral, Q6H PRN, Riccardo Mckay III, MD, 30 mL at 17 1713  •  atorvastatin (LIPITOR) tablet 40 mg, 40 mg, Oral, Nightly, Grabiel Westfall MD, 40 mg at 17 2255  •  [] dextromethorphan-quinidine (NUEDEXTA) capsule 1 capsule, 1 capsule, Oral, Daily, 1 capsule at 17 1316 **FOLLOWED BY** dextromethorphan-quinidine (NUEDEXTA) capsule 1 capsule, 1 capsule, Oral, Q12H, Riccardo Mckay III, MD, 1 capsule at 17 1146  •  Divalproex Sodium (DEPAKOTE SPRINKLE) capsule 250 mg, 250 mg, Oral, 4x Daily, Rene Marie MD, 250 mg at 17 1146  •  donepezil (ARICEPT) tablet 10 mg, 10 mg, Oral, Nightly, Riccardo Mckay III, MD, 10 mg at 17 2255  •  HYDROcodone-acetaminophen (NORCO) 5-325 MG per tablet 1 tablet, 1 tablet, Oral, Q6H, Riccardo Mckay III, MD, 1 tablet at 17 1145  •  ibuprofen (ADVIL,MOTRIN) tablet 400 mg, 400 mg, Oral, Q6H PRN, Rene Marie MD, 400 mg at 17 1851  •  magnesium hydroxide (MILK OF MAGNESIA) suspension 2400 mg/10mL 10 mL, 10 mL, Oral, Daily PRN, Riccardo Mckay III, MD, 10 mL at 17 1618  •  memantine (NAMENDA) tablet 10 mg, 10 mg, Oral, BID, Riccardo Mckay III, MD, 10 mg at 17 1147  •  metoprolol succinate XL (TOPROL-XL) 24 hr tablet 50 mg, 50 mg, Oral, Daily, Grabiel Westfall MD, 50 mg at 17 1147  •  pantoprazole (PROTONIX) EC tablet 40 mg, 40 mg, Oral, Q AM, Grabiel Westfall MD, 40 mg at 17 1146  •  primidone (MYSOLINE) tablet 125 mg, 125 mg, Oral, Daily, Rene Marie MD, 125 mg at 17 1149  •  primidone (MYSOLINE) tablet 500 mg, 500 mg, Oral, Nightly, Rene Marie MD, 500 mg at 17 2259  •  ziprasidone (GEODON) capsule 20 mg, 20 mg, Oral, BID With Meals, Riccardo Razo  Woody THOMPSON MD, 20 mg at 03/08/17 1146  •  ziprasidone (GEODON) injection 20 mg, 20 mg, Intramuscular, Q6H PRN, Riccardo Razo Woody THOMPSON MD, 20 mg at 02/23/17 1601       ASSESSMENT:  1.  Dementia, Alzheimer type with visual disturbances.    2.  Hypertension.    3.  Hyperlipidemia.    4.  Morbidly obese.    5.  Mildly dehydrated.    6.  History of temporal arthritis.     PLAN:   CPM  PER PSYCH  STRESS ULCER/DVT PROPHYLAXIS  D/W FAMILY  WILL FOLLOW      Grabiel Westfall M.D.

## 2017-03-09 RX ORDER — HYDROCODONE BITARTRATE AND ACETAMINOPHEN 5; 325 MG/1; MG/1
1 TABLET ORAL EVERY 6 HOURS PRN
Status: DISCONTINUED | OUTPATIENT
Start: 2017-03-09 | End: 2017-03-10 | Stop reason: HOSPADM

## 2017-03-09 RX ORDER — ATORVASTATIN CALCIUM 10 MG/1
10 TABLET, FILM COATED ORAL NIGHTLY
Status: DISCONTINUED | OUTPATIENT
Start: 2017-03-09 | End: 2017-03-10 | Stop reason: HOSPADM

## 2017-03-09 RX ADMIN — METOPROLOL SUCCINATE 50 MG: 50 TABLET, FILM COATED, EXTENDED RELEASE ORAL at 09:50

## 2017-03-09 RX ADMIN — MEMANTINE HYDROCHLORIDE 10 MG: 10 TABLET, FILM COATED ORAL at 09:50

## 2017-03-09 RX ADMIN — HYDROCODONE BITARTATE AND ACETAMINOPHEN 1 TABLET: 5; 325 TABLET ORAL at 00:50

## 2017-03-09 RX ADMIN — DONEPEZIL HYDROCHLORIDE 10 MG: 10 TABLET, FILM COATED ORAL at 21:14

## 2017-03-09 RX ADMIN — ATORVASTATIN CALCIUM 10 MG: 10 TABLET, FILM COATED ORAL at 21:14

## 2017-03-09 RX ADMIN — MEMANTINE HYDROCHLORIDE 10 MG: 10 TABLET, FILM COATED ORAL at 18:27

## 2017-03-09 RX ADMIN — DEXTROMETHORPHAN HYDROBROMIDE AND QUINIDINE SULFATE 1 CAPSULE: 20; 10 CAPSULE, GELATIN COATED ORAL at 09:49

## 2017-03-09 RX ADMIN — DEXTROMETHORPHAN HYDROBROMIDE AND QUINIDINE SULFATE 1 CAPSULE: 20; 10 CAPSULE, GELATIN COATED ORAL at 21:15

## 2017-03-09 RX ADMIN — PRIMIDONE 250 MG: 250 TABLET ORAL at 09:49

## 2017-03-09 RX ADMIN — DIVALPROEX SODIUM 250 MG: 125 CAPSULE, COATED PELLETS ORAL at 18:27

## 2017-03-09 RX ADMIN — ZIPRASIDONE HYDROCHLORIDE 20 MG: 20 CAPSULE ORAL at 18:27

## 2017-03-09 RX ADMIN — HYDROCODONE BITARTATE AND ACETAMINOPHEN 1 TABLET: 5; 325 TABLET ORAL at 09:43

## 2017-03-09 RX ADMIN — DIVALPROEX SODIUM 250 MG: 125 CAPSULE, COATED PELLETS ORAL at 09:43

## 2017-03-09 RX ADMIN — ZIPRASIDONE HYDROCHLORIDE 20 MG: 20 CAPSULE ORAL at 09:53

## 2017-03-09 RX ADMIN — DIVALPROEX SODIUM 250 MG: 125 CAPSULE, COATED PELLETS ORAL at 21:14

## 2017-03-09 RX ADMIN — PANTOPRAZOLE SODIUM 40 MG: 40 TABLET, DELAYED RELEASE ORAL at 09:52

## 2017-03-09 RX ADMIN — DIVALPROEX SODIUM 250 MG: 125 CAPSULE, COATED PELLETS ORAL at 13:11

## 2017-03-09 NOTE — PROGRESS NOTES
" DAILY PROGRESS NOTE    CHIEF COMPLAINT  DOING SAME  MORE SLEEPY  GOT EXTRA DOSE OF PRIMIDONE        HISTORY OF PRESENT ILLNESS: A 76-year-old  female with a history of dementia, hypertension, admitted through the emergency room to the psychiatric unit with increased agitation and combativeness. The patient is also having auditory and visual hallucinations. I am asked to follow the patient for medical problems and do the history and physical.     PHYSICAL EXAMINATION:Blood pressure 148/84, pulse 84, temperature 97.3 °F (36.3 °C), temperature source Axillary, resp. rate 18, height 64\" (162.6 cm), weight 252 lb (114 kg), SpO2 97 %.    GENERAL: Alert, combative, in no respiratory distress.   HEENT: Unremarkable.    NECK: Supple.   LUNGS: Decreased breath sounds.   HEART: S1 and S2.    ABDOMEN: Soft, nontender. Bowel sounds positive.   EXTREMITIES: No edema.    NEUROLOGIC: Moves all 4 extremities. Gait and balance not checked.     DIAGNOSTIC DATA:   Lab Results (last 24 hours)     ** No results found for the last 24 hours. **        Lab Results (last 24 hours)     ** No results found for the last 24 hours. **        Lab Results (last 24 hours)     ** No results found for the last 24 hours. **        Lab Results (last 24 hours)     ** No results found for the last 24 hours. **        Lab Results (last 24 hours)     ** No results found for the last 24 hours. **        Lab Results (last 24 hours)     ** No results found for the last 24 hours. **        Lab Results (last 24 hours)     ** No results found for the last 24 hours. **        Lab Results (last 24 hours)     ** No results found for the last 24 hours. **      Labs showed a potassium of 4.3, BUN 12, creatinine 1.1. White count 6.6, hemoglobin 10.7, platelets 226,000. UA is negative. Toxicology screen is positive for barbiturates.    Imaging Results (last 72 hours)     ** No results found for the last 72 hours. **        Current Facility-Administered " Medications:   •  acetaminophen (TYLENOL) tablet 650 mg, 650 mg, Oral, Q6H PRN, Rene Marie MD, 650 mg at 17 1447  •  aluminum-magnesium hydroxide-simethicone (MAALOX/MYLANTA) suspension 30 mL, 30 mL, Oral, Q6H PRN, Riccardo Mckay III, MD, 30 mL at 17 1713  •  atorvastatin (LIPITOR) tablet 40 mg, 40 mg, Oral, Nightly, Grabiel Westfall MD, 40 mg at 17  •  [] dextromethorphan-quinidine (NUEDEXTA) capsule 1 capsule, 1 capsule, Oral, Daily, 1 capsule at 17 1316 **FOLLOWED BY** dextromethorphan-quinidine (NUEDEXTA) capsule 1 capsule, 1 capsule, Oral, Q12H, Riccrado Mckay III, MD, 1 capsule at 17 0949  •  Divalproex Sodium (DEPAKOTE SPRINKLE) capsule 250 mg, 250 mg, Oral, 4x Daily, Rene Marie MD, 250 mg at 17 1311  •  donepezil (ARICEPT) tablet 10 mg, 10 mg, Oral, Nightly, Riccardo Mckay III, MD, 10 mg at 178  •  HYDROcodone-acetaminophen (NORCO) 5-325 MG per tablet 1 tablet, 1 tablet, Oral, Q6H, Riccardo Mckay III, MD, 1 tablet at 17 0943  •  ibuprofen (ADVIL,MOTRIN) tablet 400 mg, 400 mg, Oral, Q6H PRN, Rene Marie MD, 400 mg at 17 1851  •  magnesium hydroxide (MILK OF MAGNESIA) suspension 2400 mg/10mL 10 mL, 10 mL, Oral, Daily PRN, Riccardo Mckay III, MD, 10 mL at 17 1618  •  memantine (NAMENDA) tablet 10 mg, 10 mg, Oral, BID, Riccardo Mckay III, MD, 10 mg at 17 0950  •  metoprolol succinate XL (TOPROL-XL) 24 hr tablet 50 mg, 50 mg, Oral, Daily, Grabiel Westfall MD, 50 mg at 17 0950  •  pantoprazole (PROTONIX) EC tablet 40 mg, 40 mg, Oral, Q AM, Grabiel Westfall MD, 40 mg at 1752  •  primidone (MYSOLINE) tablet 125 mg, 125 mg, Oral, Daily, Rene Marie MD, 250 mg at 17  •  primidone (MYSOLINE) tablet 500 mg, 500 mg, Oral, Nightly, Rene Marie MD, 500 mg at 17  •  ziprasidone (GEODON) capsule 20 mg, 20 mg,  Oral, BID With Meals, Riccardo Mckay III, MD, 20 mg at 03/09/17 0953  •  ziprasidone (GEODON) injection 20 mg, 20 mg, Intramuscular, Q6H PRN, Riccardo Mckay III, MD, 20 mg at 02/23/17 1602       ASSESSMENT:  1.  Dementia, Alzheimer type with visual disturbances.    2.  Hypertension.    3.  Hyperlipidemia.    4.  Morbidly obese.    5.  Mildly dehydrated.    6.  History of temporal arthritis.     PLAN:   CPM  PER PSYCH  HOLD EVENING DOSE OF PRIMIDONE  OBSERVE  D/W FAMILY  WILL FOLLOW      Grabiel Westfall M.D.

## 2017-03-09 NOTE — PLAN OF CARE
Problem:  Patient Care Overview (Adult)  Goal: Plan of Care Review  Outcome: Ongoing (interventions implemented as appropriate)    02/19/17 1103 03/09/17 0100 03/09/17 0445   Coping/Psychosocial Response Interventions   Plan Of Care Reviewed With --  patient --    Coping/Psychosocial   Patient Agreement with Plan of Care --  unable to participate --    Patient Care Overview   Consent Given to Review Plan with Pt presents w increased generalized weakness as well as impaired cognition limiting pts safety and independence w functional mobility. Due to the above pt will benifit from skilled PT. --  --    Progress --  --  progress toward functional goals is gradual   Outcome Evaluation   Outcome Summary/Follow up Plan --  --  Unable to assess anxiety, depression, and SI/HI r/t confusion. Cooperative and med compliant. Continue to monitor and assess.        Goal: Interdisciplinary Rounds/Family Conference  Outcome: Ongoing (interventions implemented as appropriate)  Goal: Individualization and Mutuality  Outcome: Ongoing (interventions implemented as appropriate)  Goal: Discharge Needs Assessment  Outcome: Ongoing (interventions implemented as appropriate)    Problem:  Overarching Goals  Goal: Adheres to Safety Considerations for Self and Others  Outcome: Ongoing (interventions implemented as appropriate)  Intervention: Develop/maintain Individualized Safety Plan    03/06/17 0136 03/07/17 1600 03/08/17 1801   Safety   Safety Measures --  --  --    Mental Health Homicide Risk   Homicidal Ideation --  --  --    Willingness to Contact Staff Member if Feeling Like Hurting Others yes --  --    Provide Emotional/Physical Safety   Suicidal Ideation --  --  --    Suicide Plan/Availability --  (PT is confused, unabale to assess) --    Willingness to Contact Staff Member if Feeling Like Hurting Self --  --  (Unable to assess)     03/09/17 0100   Safety   Safety Measures safety rounds completed;suicide assessment completed    Mental Health Homicide Risk   Homicidal Ideation other (see comments)  (unable to assess)   Willingness to Contact Staff Member if Feeling Like Hurting Others --    Provide Emotional/Physical Safety   Suicidal Ideation other (see comments)  (unable to assess)   Suicide Plan/Availability --    Willingness to Contact Staff Member if Feeling Like Hurting Self --          Goal: Optimized Coping Skills in Response to Life Stressors  Outcome: Ongoing (interventions implemented as appropriate)  Intervention: Promote Effective Coping Strategies    03/09/17 0100   Coping Strategies   Supportive Measures active listening utilized;positive reinforcement provided;self-care encouraged;self-reflection promoted;self-responsibility promoted;verbalization of feelings encouraged         Goal: Develops/Participates in Therapeutic [place] to Support Successful Transition  Outcome: Ongoing (interventions implemented as appropriate)  Intervention: Foster Therapeutic [place]    03/09/17 0100   Coping Strategies   Trust Relationship/Rapport care explained;choices provided;emotional support provided;empathic listening provided;questions answered;questions encouraged;reassurance provided;thoughts/feelings acknowledged       Intervention: Mutually Develop Transition Plan    03/09/17 0100   OTHER   Transition Support crisis management plan promoted

## 2017-03-09 NOTE — PLAN OF CARE
Problem:  Patient Care Overview (Adult)  Goal: Plan of Care Review  Outcome: Ongoing (interventions implemented as appropriate)    03/09/17 1417   Coping/Psychosocial Response Interventions   Plan Of Care Reviewed With son;healthcare surrogate   Coping/Psychosocial   Patient Agreement with Plan of Care agrees with comment (describe)  (asked about appeal options and cms imm letter given )   Patient Care Overview   Progress improving   Outcome Evaluation   Outcome Summary/Follow up Plan Spoke to nydia Centeno ([phone]) via phone and discussed 2 accepting facilities for pt: [facility] and [facility]. Son acknowledged getting asked by merrill to tour Wednesday and he stated family could not locate it. This SW reminded son that DC was still expected for Friday if no change in status as pt has been stable overall with meds and behavior for a week. This SW informed son what facilities had assessed: like pt's intermittent participation on the unit, pt may not be able to fully function in rehab at facility and if not, family will need to either private pay or apply for medicaid. Also reviewed with son, the conversation this SW had with Dr. Mckay who stated pt was doing well on Nudexta but if continued physical decline occurred would recommend a palliative care plan at the SNF of choice. Son expressed understanding of this. Gave CMS IMM letter and explained appeal over phone. Son stated he would have faily members tour both facilities tonight and would hopefully have an answer in the a.m. SW will continue to follow

## 2017-03-09 NOTE — NURSING NOTE
PT continues to have a calmer and agitation free day. Confusion with cooperation. Compliant with medications. PT's family requests that she will be up out of bed more. Will con't to monitor.

## 2017-03-09 NOTE — PLAN OF CARE
Problem:  Patient Care Overview (Adult)  Goal: Plan of Care Review  Outcome: Ongoing (interventions implemented as appropriate)    03/09/17 1609   Coping/Psychosocial Response Interventions   Plan Of Care Reviewed With patient   Coping/Psychosocial   Patient Agreement with Plan of Care unable to participate   Patient Care Overview   Progress improving   Outcome Evaluation   Outcome Summary/Follow up Plan The patient is only oriented to self. The patient has spent most the day sleeping. The patient has been yelling out less today. The patient has been able to eat some meals with assistance. Cooperative with medications in ice cream. Continuing to monitor.        Goal: Interdisciplinary Rounds/Family Conference  Outcome: Ongoing (interventions implemented as appropriate)  Goal: Individualization and Mutuality  Outcome: Ongoing (interventions implemented as appropriate)  Goal: Discharge Needs Assessment  Outcome: Ongoing (interventions implemented as appropriate)    Problem:  Overarching Goals  Goal: Adheres to Safety Considerations for Self and Others  Outcome: Ongoing (interventions implemented as appropriate)  Intervention: Develop/maintain Individualized Safety Plan    03/09/17 1332   Safety   Safety Measures safety rounds completed;suicide assessment completed   Mental Health Homicide Risk   Homicidal Ideation other (see comments)  (unable to assess due to confusion)   Provide Emotional/Physical Safety   Suicidal Ideation other (see comments)  (unable to assess due to confusion)         Goal: Optimized Coping Skills in Response to Life Stressors  Outcome: Ongoing (interventions implemented as appropriate)  Intervention: Promote Effective Coping Strategies    03/09/17 1332   Coping Strategies   Supportive Measures active listening utilized;relaxation techniques promoted;verbalization of feelings encouraged         Goal: Develops/Participates in Therapeutic [place] to Support Successful Transition  Outcome:  Ongoing (interventions implemented as appropriate)  Intervention: Foster Therapeutic [place]    03/09/17 1331   Coping Strategies   Trust Relationship/Rapport care explained;choices provided;emotional support provided;empathic listening provided;questions answered;questions encouraged;reassurance provided;thoughts/feelings acknowledged           Problem: Fall Risk (Adult)  Goal: Absence of Falls  Outcome: Ongoing (interventions implemented as appropriate)    03/09/17 1609   Fall Risk (Adult)   Absence of Falls making progress toward outcome         Problem: Skin Integrity Impairment, Risk/Actual (Adult)  Goal: Skin Integrity/Wound Healing  Outcome: Ongoing (interventions implemented as appropriate)    03/09/17 1609   Skin Integrity Impairment, Risk/Actual (Adult)   Skin Integrity/Wound Healing making progress toward outcome

## 2017-03-10 VITALS
WEIGHT: 252 LBS | TEMPERATURE: 97 F | RESPIRATION RATE: 20 BRPM | SYSTOLIC BLOOD PRESSURE: 182 MMHG | OXYGEN SATURATION: 96 % | BODY MASS INDEX: 43.02 KG/M2 | HEART RATE: 79 BPM | DIASTOLIC BLOOD PRESSURE: 82 MMHG | HEIGHT: 64 IN

## 2017-03-10 LAB
ALBUMIN SERPL-MCNC: 2.8 G/DL (ref 3.5–5.2)
ALBUMIN/GLOB SERPL: 0.7 G/DL
ALP SERPL-CCNC: 70 U/L (ref 39–117)
ALT SERPL W P-5'-P-CCNC: 17 U/L (ref 1–33)
ANION GAP SERPL CALCULATED.3IONS-SCNC: 14.7 MMOL/L
AST SERPL-CCNC: 22 U/L (ref 1–32)
BASOPHILS # BLD AUTO: 0.04 10*3/MM3 (ref 0–0.2)
BASOPHILS NFR BLD AUTO: 0.5 % (ref 0–1.5)
BILIRUB SERPL-MCNC: 0.2 MG/DL (ref 0.1–1.2)
BUN BLD-MCNC: 16 MG/DL (ref 8–23)
BUN/CREAT SERPL: 20.8 (ref 7–25)
CALCIUM SPEC-SCNC: 9.1 MG/DL (ref 8.6–10.5)
CHLORIDE SERPL-SCNC: 106 MMOL/L (ref 98–107)
CO2 SERPL-SCNC: 23.3 MMOL/L (ref 22–29)
CREAT BLD-MCNC: 0.77 MG/DL (ref 0.57–1)
DEPRECATED RDW RBC AUTO: 61.3 FL (ref 37–54)
EOSINOPHIL # BLD AUTO: 0.18 10*3/MM3 (ref 0–0.7)
EOSINOPHIL NFR BLD AUTO: 2 % (ref 0.3–6.2)
ERYTHROCYTE [DISTWIDTH] IN BLOOD BY AUTOMATED COUNT: 17.4 % (ref 11.7–13)
GFR SERPL CREATININE-BSD FRML MDRD: 89 ML/MIN/1.73
GLOBULIN UR ELPH-MCNC: 4.2 GM/DL
GLUCOSE BLD-MCNC: 91 MG/DL (ref 65–99)
HCT VFR BLD AUTO: 37.9 % (ref 35.6–45.5)
HGB BLD-MCNC: 11.8 G/DL (ref 11.9–15.5)
IMM GRANULOCYTES # BLD: 0.05 10*3/MM3 (ref 0–0.03)
IMM GRANULOCYTES NFR BLD: 0.6 % (ref 0–0.5)
LYMPHOCYTES # BLD AUTO: 2.06 10*3/MM3 (ref 0.9–4.8)
LYMPHOCYTES NFR BLD AUTO: 23.3 % (ref 19.6–45.3)
MCH RBC QN AUTO: 29.9 PG (ref 26.9–32)
MCHC RBC AUTO-ENTMCNC: 31.1 G/DL (ref 32.4–36.3)
MCV RBC AUTO: 95.9 FL (ref 80.5–98.2)
MONOCYTES # BLD AUTO: 1.43 10*3/MM3 (ref 0.2–1.2)
MONOCYTES NFR BLD AUTO: 16.2 % (ref 5–12)
NEUTROPHILS # BLD AUTO: 5.07 10*3/MM3 (ref 1.9–8.1)
NEUTROPHILS NFR BLD AUTO: 57.4 % (ref 42.7–76)
PLATELET # BLD AUTO: 212 10*3/MM3 (ref 140–500)
PMV BLD AUTO: 10.8 FL (ref 6–12)
POTASSIUM BLD-SCNC: 3.8 MMOL/L (ref 3.5–5.2)
PROT SERPL-MCNC: 7 G/DL (ref 6–8.5)
RBC # BLD AUTO: 3.95 10*6/MM3 (ref 3.9–5.2)
SODIUM BLD-SCNC: 144 MMOL/L (ref 136–145)
WBC NRBC COR # BLD: 8.83 10*3/MM3 (ref 4.5–10.7)

## 2017-03-10 PROCEDURE — 80053 COMPREHEN METABOLIC PANEL: CPT | Performed by: HOSPITALIST

## 2017-03-10 PROCEDURE — 85025 COMPLETE CBC W/AUTO DIFF WBC: CPT | Performed by: HOSPITALIST

## 2017-03-10 RX ORDER — PRIMIDONE 250 MG/1
125 TABLET ORAL DAILY
Qty: 60 TABLET | Refills: 0 | Status: SHIPPED | OUTPATIENT
Start: 2017-03-10

## 2017-03-10 RX ORDER — DIVALPROEX SODIUM 125 MG/1
250 CAPSULE, COATED PELLETS ORAL 4 TIMES DAILY
Qty: 60 CAPSULE | Refills: 0 | Status: SHIPPED | OUTPATIENT
Start: 2017-03-10

## 2017-03-10 RX ORDER — PANTOPRAZOLE SODIUM 40 MG/1
40 TABLET, DELAYED RELEASE ORAL DAILY
Qty: 30 TABLET | Refills: 0 | Status: SHIPPED | OUTPATIENT
Start: 2017-03-10

## 2017-03-10 RX ORDER — ZIPRASIDONE HYDROCHLORIDE 20 MG/1
20 CAPSULE ORAL 2 TIMES DAILY WITH MEALS
Qty: 60 CAPSULE | Refills: 0 | Status: SHIPPED | OUTPATIENT
Start: 2017-03-10

## 2017-03-10 RX ORDER — HYDROCODONE BITARTRATE AND ACETAMINOPHEN 5; 325 MG/1; MG/1
1 TABLET ORAL EVERY 6 HOURS PRN
Qty: 75 TABLET | Refills: 0 | Status: SHIPPED | OUTPATIENT
Start: 2017-03-10

## 2017-03-10 RX ADMIN — PRIMIDONE 125 MG: 250 TABLET ORAL at 08:47

## 2017-03-10 RX ADMIN — ZIPRASIDONE HYDROCHLORIDE 20 MG: 20 CAPSULE ORAL at 08:47

## 2017-03-10 RX ADMIN — METOPROLOL SUCCINATE 50 MG: 50 TABLET, FILM COATED, EXTENDED RELEASE ORAL at 08:46

## 2017-03-10 RX ADMIN — DEXTROMETHORPHAN HYDROBROMIDE AND QUINIDINE SULFATE 1 CAPSULE: 20; 10 CAPSULE, GELATIN COATED ORAL at 08:48

## 2017-03-10 RX ADMIN — DIVALPROEX SODIUM 250 MG: 125 CAPSULE, COATED PELLETS ORAL at 08:47

## 2017-03-10 RX ADMIN — PANTOPRAZOLE SODIUM 40 MG: 40 TABLET, DELAYED RELEASE ORAL at 08:48

## 2017-03-10 RX ADMIN — MEMANTINE HYDROCHLORIDE 10 MG: 10 TABLET, FILM COATED ORAL at 08:47

## 2017-03-10 NOTE — PSYCH
DATE OF DISCHARGE: 03/10/2017     HISTORY: The patient is a 76-year-old  female admitted with worsening behavioral symptoms related to a diagnosis of Alzheimer's dementia.     HOSPITAL COURSE: The patient was admitted to the [facility] and continued on previously prescribed home medications including donepezil and Namenda.  The patient was initially noted to be extremely verbally abusive and uncooperative with care necessitating initiation a fairly aggressive pharmacotherapy.  Nuedexta was added.  It was given this physician’s feeling that the patient may be suffering from pseudobulbar affect and this medication did seem to have a significantly positive effect on the patient's symptoms, with the patient becoming much calmer, more cooperative with care, though she did occasionally have periods of screaming out.  Generally, though these were appropriate and in response to painful stimuli. By 03/10/2017, the patient's behavior had improved sufficiently that we were able to refer her to a nursing home.  The patient unfortunately was less than optimally cooperative with physical therapy and occupational therapy. It was difficult getting the patient out of bed initially but she did comply with sitting in the day room throughout much of her latter stay in the hospital.     DISCHARGE DIAGNOSES:   1.   Primary dementia, Alzheimer's type with behavioral disturbance.   2.   Morbid obesity.   3.   Hypertension.   4.   Gastroesophageal reflux disease.   5.   Seizure disorder.     DISCHARGE MEDICATIONS:  1.   Lipitor 10 mg nightly for dyslipidemia.   2.   Nuedexta 1 tablet b.i.d. daily for pseudobulbar affect.   3.   Depakote sprinkles 250 mg q.i.d. for mood stabilization.   4.   Donepezil 10 mg nightly for dementia.   5.   Namenda 10 mg b.i.d. for dementia.   6.   Toprol-XL 50 mg daily for hypertension.   7.   Protonix 40 mg daily for GERD.  8.   Primidone 125 mg daily, 500 mg nightly for seizure disorder.   9.    Geodon 20 mg b.i.d. for mood stabilization.   10. Ibuprofen 400 mg q.6 h. p.r.n. pain.  11. Norco 5/325, take 1 tablet q.6 h. p.r.n., moderate pain.     DISCHARGE INSTRUCTIONS: No dietary or physical restrictions placed on the patient at the time of discharge. Followup to take place through the auspices of [facility] mental health resources.     PROGNOSIS: The patient's prognosis is considered guarded.           MAREN Pritchard:zulema  D:   03/10/2017 09:56:56  T:   03/10/2017 10:15:51  Job ID:   [id number]  Document ID:   [id number]  cc:

## 2017-03-10 NOTE — SIGNIFICANT NOTE
03/10/17 1001   PT Discharge Summary   Reason for Discharge Discharge from facility   Outcomes Achieved Unable to make functional progress toward goals at this time   Discharge Destination SNF

## 2017-03-10 NOTE — PLAN OF CARE
Problem:  Patient Care Overview (Adult)  Goal: Plan of Care Review  Outcome: Ongoing (interventions implemented as appropriate)    02/19/17 1103 03/10/17 0215 03/10/17 0359   Coping/Psychosocial Response Interventions   Plan Of Care Reviewed With --  patient --    Coping/Psychosocial   Patient Agreement with Plan of Care --  unable to participate --    Patient Care Overview   Consent Given to Review Plan with Pt presents w increased generalized weakness as well as impaired cognition limiting pts safety and independence w functional mobility. Due to the above pt will benifit from skilled PT. --  --    Progress --  --  no change   Outcome Evaluation   Outcome Summary/Follow up Plan --  --  Pt remains confused. Unable to assess anxiety, depression, and SI/HI. Cooperative and med compliant. Continue to monitor and assess.        Goal: Interdisciplinary Rounds/Family Conference  Outcome: Ongoing (interventions implemented as appropriate)  Goal: Individualization and Mutuality  Outcome: Ongoing (interventions implemented as appropriate)  Goal: Discharge Needs Assessment  Outcome: Ongoing (interventions implemented as appropriate)    Problem:  Overarching Goals  Goal: Adheres to Safety Considerations for Self and Others  Outcome: Ongoing (interventions implemented as appropriate)  Intervention: Develop/maintain Individualized Safety Plan    03/07/17 1600 03/10/17 0215   Safety   Safety Measures --  safety rounds completed;suicide assessment completed   Mental Health Homicide Risk   Homicidal Ideation --  other (see comments)  (unable to assess)   Willingness to Contact Staff Member if Feeling Like Hurting Others --  other (see comments)  (unable to assess)   Provide Emotional/Physical Safety   Suicidal Ideation --  other (see comments)  (unable to assess)   Suicide Plan/Availability (PT is confused, unabale to assess) --    Willingness to Contact Staff Member if Feeling Like Hurting Self --  other (see  comments)  (unable to assess)         Goal: Optimized Coping Skills in Response to Life Stressors  Outcome: Ongoing (interventions implemented as appropriate)  Intervention: Promote Effective Coping Strategies    03/10/17 0215   Coping Strategies   Supportive Measures active listening utilized;positive reinforcement provided;self-care encouraged;self-reflection promoted;self-responsibility promoted;verbalization of feelings encouraged         Goal: Develops/Participates in Therapeutic [place] to Support Successful Transition  Outcome: Ongoing (interventions implemented as appropriate)  Intervention: Foster Therapeutic [place]    03/10/17 0215   Coping Strategies   Trust Relationship/Rapport care explained;choices provided;emotional support provided;empathic listening provided;questions answered;questions encouraged;reassurance provided;thoughts/feelings acknowledged       Intervention: Mutually Develop Transition Plan    03/09/17 0100   OTHER   Transition Support crisis management plan promoted

## 2017-03-10 NOTE — PLAN OF CARE
"Problem:  Patient Care Overview (Adult)  Goal: Plan of Care Review  Outcome: Ongoing (interventions implemented as appropriate)    03/10/17 1232   Coping/Psychosocial Response Interventions   Plan Of Care Reviewed With son;healthcare surrogate   Coping/Psychosocial   Patient Agreement with Plan of Care agrees   Patient Care Overview   Progress improving   Outcome Evaluation   Outcome Summary/Follow up Plan Per orders, pt is discharging today to [facility] ([phone]) for skilled rehab with transition to LTC. Pt's family toured and accepted this facility. DC time confirmed with pt's POA/son Jacobo. Son, expressed concern in the 4-5 days notice given regarding discharge stating he felt rushed. Son/POA was offered appeal information as discussed 3/9 and son stated \"I don't need appeal info, I just need to express my concerns. Son was called again and Dr. Mckay spoke with son . Dr Mckay confirmed we were moving forward with DC. Pt is calm and pleasant this morning and up watching tv in chair. transfer packet sent with pt and DC summar included in fax to facility. At this time, no further follow up. Pt will see facility providers for medical and psych needs.           "

## 2017-03-10 NOTE — PLAN OF CARE
Problem: Inpatient Physical Therapy  Goal: Bed Mobility Goal LTG- PT  Outcome: Unable to achieve outcome(s) by discharge Date Met:  03/10/17  Goal: Transfer Training Goal 1 LTG- PT  Outcome: Unable to achieve outcome(s) by discharge Date Met:  03/10/17

## 2017-05-09 NOTE — PLAN OF CARE
Problem: [facility] Patient Care Overview (Adult)  Goal: Plan of Care Review  Outcome: Ongoing (interventions implemented as appropriate)    03/07/17 3931   Coping/Psychosocial Response Interventions   Plan Of Care Reviewed With patient   Outcome Evaluation   Outcome Summary/Follow up Plan pnt kept her eyes closed for majority of txment session. Minimal participation w sitting balance. Unable to get pnt to attempt to reach, or even correct her balance.            no

## 2017-06-27 ENCOUNTER — HOSPITAL ENCOUNTER (INPATIENT)
Dept: HOSPITAL 23 - CED | Age: 77
LOS: 8 days | Discharge: NURSING, REHAB OR LONG-TERM CARE | DRG: 871 | End: 2017-07-05
Attending: FAMILY MEDICINE | Admitting: FAMILY MEDICINE
Payer: MEDICARE

## 2017-06-27 DIAGNOSIS — K59.09: ICD-10-CM

## 2017-06-27 DIAGNOSIS — Z96.643: ICD-10-CM

## 2017-06-27 DIAGNOSIS — J69.0: ICD-10-CM

## 2017-06-27 DIAGNOSIS — G40.909: ICD-10-CM

## 2017-06-27 DIAGNOSIS — E87.0: ICD-10-CM

## 2017-06-27 DIAGNOSIS — N39.0: ICD-10-CM

## 2017-06-27 DIAGNOSIS — E46: ICD-10-CM

## 2017-06-27 DIAGNOSIS — F03.90: ICD-10-CM

## 2017-06-27 DIAGNOSIS — A41.89: Primary | ICD-10-CM

## 2017-06-27 DIAGNOSIS — N18.9: ICD-10-CM

## 2017-06-27 DIAGNOSIS — E03.9: ICD-10-CM

## 2017-06-27 DIAGNOSIS — I12.9: ICD-10-CM

## 2017-06-27 DIAGNOSIS — J96.01: ICD-10-CM

## 2017-06-27 DIAGNOSIS — B96.4: ICD-10-CM

## 2017-06-27 DIAGNOSIS — G92: ICD-10-CM

## 2017-06-27 DIAGNOSIS — N17.9: ICD-10-CM

## 2017-06-27 DIAGNOSIS — E78.5: ICD-10-CM

## 2017-06-27 DIAGNOSIS — K21.9: ICD-10-CM

## 2017-06-27 LAB
BAND: 10 % (ref 0–10)
BARBITURATES UR QL SCN: 0.2 MG/DL (ref 0.2–2)
BARBITURATES UR QL SCN: 2 G/DL (ref 3.5–5)
BASOPHIL#: 0.1 X10E3 (ref 0–0.3)
BASOPHIL%: 0.4 % (ref 0–2.5)
BENZODIAZ UR QL SCN: 25 U/L (ref 10–42)
BENZODIAZ UR QL SCN: 27 U/L (ref 10–40)
BLOOD UREA NITROGEN: 43 MG/DL (ref 9–23)
BUN/CREATININE RATIO: 43
BZE UR QL SCN: 80 U/L (ref 32–92)
CALCIUM SERUM: 9.4 MG/DL (ref 8.4–10.2)
CK MB SERPL-RTO: 15.1 % (ref 11–15.5)
CK MB SERPL-RTO: 30.8 G/DL (ref 30–36)
CREATININE SERUM: 1 MG/DL (ref 0.6–1.4)
DIFF IND: YES
EOSINOPHIL#: 0.1 X10E3 (ref 0–0.7)
EOSINOPHIL%: 0.4 % (ref 0–7)
EOSINOPHIL: 1 % (ref 0–7)
GENTAMICIN PEAK SERPL-MCNC: YES MG/L
GLOM FILT RATE ESTIMATED: 64.3 ML/MIN (ref 60–?)
GLUCOSE FASTING: 111 MG/DL (ref 70–110)
HEMATOCRIT: 38 % (ref 35–45)
HEMOGLOBIN: 11.7 GM/DL (ref 12–16)
KETONES UR QL: 125 MMOL/L (ref 100–111)
KETONES UR QL: 26 MMOL/L (ref 22–31)
LYMPHOCYTE#: 1.7 X10E3 (ref 1–3.5)
LYMPHOCYTE%: 9.9 % (ref 17–45)
LYMPHOCYTE: 12 % (ref 17–50)
MACROCYTOSIS: (no result)
MEAN CELL VOLUME: 101 FL (ref 83–96)
MEAN CORPUSCULAR HEMOGLOBIN: 31.1 PG (ref 28–34)
MEAN PLATELET VOLUME: 8.4 FL (ref 6.5–11.5)
METHADONE UR QL SCN: 3.5 NG/ML (ref 0–7.9)
MONOCYTE#: 1 X10E3 (ref 0–1)
MONOCYTE%: 5.8 % (ref 3–12)
MONOCYTE: 4 % (ref 3–12)
NEUTROPHIL#: 14.2 X10E3 (ref 1.5–7.1)
NEUTROPHIL%: 83.5 % (ref 40–75)
NEUTROPHIL: 73 % (ref 40–75)
PLATELET COUNT: 269 X10E3 (ref 140–420)
PLATELET ESTIMATE: NORMAL
POC - TROPONIN: <0.05 NG/ML (ref ?–0.05)
POTASSIUM: 3.7 MMOL/L (ref 3.5–5.1)
PROTEIN TOTAL SERUM: 7.8 G/DL (ref 6–8.3)
RED BLOOD COUNT: 3.76 X10E (ref 3.9–5.3)
SODIUM: 158 MMOL/L (ref 135–145)
URBCS1 AUWI: (no result) /[HPF] (ref 0–2)
URINE APPEARANCE: (no result)
URINE BACTERIA AUWI: (no result)
URINE BLOOD: (no result)
URINE COLOR: (no result)
URINE GLUCOSE: (no result) MG/DL
URINE KETONE: (no result)
URINE LEUKOCYTE ESTERASE: (no result)
URINE NITRATE: (no result)
URINE PH: 8 (ref 5–8)
URINE PROTEIN: (no result)
URINE SOURCE: (no result)
URINE SPECIFIC GRAVITY: 1.03 (ref 1–1.03)
URINE SQUAMOUS EPITHELIAL CELL: (no result) /[HPF]
URINE UROBILINOGEN: 1 MG/DL
UWBCS1 AUWI: (no result) (ref 0–5)
WHITE BLOOD COUNT: 17 X10E3 (ref 4–10.5)

## 2017-06-27 PROCEDURE — C9113 INJ PANTOPRAZOLE SODIUM, VIA: HCPCS

## 2017-06-27 PROCEDURE — C1769 GUIDE WIRE: HCPCS

## 2017-06-28 LAB
ARTERIAL BLOOD GAS ART SITE: (no result)
ARTERIAL BLOOD GAS DELIVERY: (no result)
ARTERIAL BLOOD GAS LITER FLOW: 2
BARBITURATES UR QL SCN: 1.5 G/DL (ref 3.5–5)
BARBITURATES UR QL SCN: <0.1 MG/DL (ref 0.2–2)
BASOPHIL#: 0.1 X10E3 (ref 0–0.3)
BASOPHIL%: 0.5 % (ref 0–2.5)
BENZODIAZ UR QL SCN: 23 U/L (ref 10–40)
BENZODIAZ UR QL SCN: 29 U/L (ref 10–42)
BLOOD UREA NITROGEN: 31 MG/DL (ref 9–23)
BLOOD UREA NITROGEN: 38 MG/DL (ref 9–23)
BUN/CREATININE RATIO: 38.75
BUN/CREATININE RATIO: 42.22
BZE UR QL SCN: 62 U/L (ref 32–92)
CALCIUM SERUM: 8.1 MG/DL (ref 8.4–10.2)
CALCIUM SERUM: 8.4 MG/DL (ref 8.4–10.2)
CK MB SERPL-RTO: 15.6 % (ref 11–15.5)
CK MB SERPL-RTO: 31.1 G/DL (ref 30–36)
CREATININE SERUM: 0.8 MG/DL (ref 0.6–1.4)
CREATININE SERUM: 0.9 MG/DL (ref 0.6–1.4)
DIFF IND: NO
EOSINOPHIL#: 0.2 X10E3 (ref 0–0.7)
EOSINOPHIL%: 1.1 % (ref 0–7)
GENTAMICIN SERPL-MCNC: 0.8 %SAT (ref 0–2)
GENTAMICIN SERPL-MCNC: 0.8 %SAT (ref 0–2)
GENTAMICIN SERPL-MCNC: NO MG/L
GENTAMICIN SERPL-MCNC: YES MG/L
GENTAMICIN TROUGH SERPL-MCNC: 0.7 %SAT (ref 0–9)
GENTAMICIN TROUGH SERPL-MCNC: 0.7 %SAT (ref 0–9)
GENTAMICIN TROUGH SERPL-MCNC: 33 MMHG (ref 35–45)
GENTAMICIN TROUGH SERPL-MCNC: 33.3 MMHG (ref 35–45)
GLOM FILT RATE ESTIMATED: 73.1 ML/MIN (ref 60–?)
GLOM FILT RATE ESTIMATED: 84.3 ML/MIN (ref 60–?)
GLUCOSE FASTING: 107 MG/DL (ref 70–110)
GLUCOSE FASTING: 136 MG/DL (ref 70–110)
HCO3 BLDA-SCNC: 21.8 MMOL/L
HCO3 BLDA-SCNC: 23.7 MMOL/L
HEMATOCRIT: 30.8 % (ref 35–45)
HEMOGLOBIN: 9.6 GM/DL (ref 12–16)
INHALED O2 CONCENTRATION: 21 %
KETONES UR QL: 128 MMOL/L (ref 100–111)
KETONES UR QL: 129 MMOL/L (ref 100–111)
KETONES UR QL: 24 MMOL/L (ref 22–31)
KETONES UR QL: 24 MMOL/L (ref 22–31)
LYMPHOCYTE#: 1.1 X10E3 (ref 1–3.5)
LYMPHOCYTE%: 8.1 % (ref 17–45)
MEAN CELL VOLUME: 102.3 FL (ref 83–96)
MEAN CORPUSCULAR HEMOGLOBIN: 31.8 PG (ref 28–34)
MEAN PLATELET VOLUME: 8.4 FL (ref 6.5–11.5)
MONOCYTE#: 0.9 X10E3 (ref 0–1)
MONOCYTE%: 6.7 % (ref 3–12)
NEUTROPHIL#: 11.6 X10E3 (ref 1.5–7.1)
NEUTROPHIL%: 83.6 % (ref 40–75)
PLATELET COUNT: 190 X10E3 (ref 140–420)
PO2 BLDA: 42.1 MMHG (ref 80–100)
PO2 BLDA: 68 MMHG (ref 80–100)
POTASSIUM: 3.1 MMOL/L (ref 3.5–5.1)
POTASSIUM: 3.2 MMOL/L (ref 3.5–5.1)
PROTEIN TOTAL SERUM: 6.2 G/DL (ref 6–8.3)
RED BLOOD COUNT: 3.01 X10E (ref 3.9–5.3)
SAO2 % BLDA: 73.8 % (ref 90–100)
SAO2 % BLDA: 92.2 % (ref 90–100)
SODIUM: 157 MMOL/L (ref 135–145)
SODIUM: 157 MMOL/L (ref 135–145)
WHITE BLOOD COUNT: 13.8 X10E3 (ref 4–10.5)

## 2017-06-28 PROCEDURE — B514YZA FLUOROSCOPY OF LEFT JUGULAR VEINS USING OTHER CONTRAST, GUIDANCE: ICD-10-PCS | Performed by: INTERNAL MEDICINE

## 2017-06-28 PROCEDURE — 05HN33Z INSERTION OF INFUSION DEVICE INTO LEFT INTERNAL JUGULAR VEIN, PERCUTANEOUS APPROACH: ICD-10-PCS | Performed by: INTERNAL MEDICINE

## 2017-06-29 LAB
BARBITURATES UR QL SCN: 0.2 MG/DL (ref 0.2–2)
BARBITURATES UR QL SCN: 1.5 G/DL (ref 3.5–5)
BASOPHIL#: 0 X10E3 (ref 0–0.3)
BASOPHIL%: 0.3 % (ref 0–2.5)
BENZODIAZ UR QL SCN: 24 U/L (ref 10–40)
BENZODIAZ UR QL SCN: 36 U/L (ref 10–42)
BLOOD UREA NITROGEN: 31 MG/DL (ref 9–23)
BUN/CREATININE RATIO: 44.28
BZE UR QL SCN: 64 U/L (ref 32–92)
CALCIUM SERUM: 8.1 MG/DL (ref 8.4–10.2)
CK MB SERPL-RTO: 15.3 % (ref 11–15.5)
CK MB SERPL-RTO: 30.7 G/DL (ref 30–36)
CREATININE SERUM: 0.7 MG/DL (ref 0.6–1.4)
DIFF IND: NO
EOSINOPHIL#: 0.6 X10E3 (ref 0–0.7)
EOSINOPHIL%: 4.8 % (ref 0–7)
GLOM FILT RATE ESTIMATED: 98.9 ML/MIN (ref 60–?)
GLUCOSE FASTING: 121 MG/DL (ref 70–110)
HEMATOCRIT: 31.1 % (ref 35–45)
HEMOGLOBIN: 9.5 GM/DL (ref 12–16)
KETONES UR QL: 123 MMOL/L (ref 100–111)
KETONES UR QL: 21 MMOL/L (ref 22–31)
L PNEUMO1 AG UR QL IA: (no result)
LYMPHOCYTE#: 1.3 X10E3 (ref 1–3.5)
LYMPHOCYTE%: 11.5 % (ref 17–45)
MAGNESIUM: 1.9 MG/DL (ref 1.6–3)
MEAN CELL VOLUME: 103 FL (ref 83–96)
MEAN CORPUSCULAR HEMOGLOBIN: 31.6 PG (ref 28–34)
MEAN PLATELET VOLUME: 8.8 FL (ref 6.5–11.5)
MONOCYTE#: 0.6 X10E3 (ref 0–1)
MONOCYTE%: 5.6 % (ref 3–12)
NEUTROPHIL#: 9 X10E3 (ref 1.5–7.1)
NEUTROPHIL%: 77.8 % (ref 40–75)
PLATELET COUNT: 177 X10E3 (ref 140–420)
POTASSIUM: 3.7 MMOL/L (ref 3.5–5.1)
PROTEIN TOTAL SERUM: 5.7 G/DL (ref 6–8.3)
RED BLOOD COUNT: 3.02 X10E (ref 3.9–5.3)
SODIUM: 149 MMOL/L (ref 135–145)
WHITE BLOOD COUNT: 11.6 X10E3 (ref 4–10.5)

## 2017-06-30 LAB
BASOPHIL#: 0.1 X10E3 (ref 0–0.3)
BASOPHIL%: 0.6 % (ref 0–2.5)
BLOOD UREA NITROGEN: 26 MG/DL (ref 9–23)
BUN/CREATININE RATIO: 37.14
CALCIUM SERUM: 7.7 MG/DL (ref 8.4–10.2)
CK MB SERPL-RTO: 14.8 % (ref 11–15.5)
CK MB SERPL-RTO: 31.3 G/DL (ref 30–36)
CREATININE SERUM: 0.7 MG/DL (ref 0.6–1.4)
DIFF IND: NO
EOSINOPHIL#: 0.6 X10E3 (ref 0–0.7)
EOSINOPHIL%: 7.4 % (ref 0–7)
GLOM FILT RATE ESTIMATED: 98.9 ML/MIN (ref 60–?)
GLUCOSE FASTING: 83 MG/DL (ref 70–110)
HEMATOCRIT: 27.7 % (ref 35–45)
HEMOGLOBIN: 8.7 GM/DL (ref 12–16)
KETONES UR QL: 118 MMOL/L (ref 100–111)
KETONES UR QL: 20 MMOL/L (ref 22–31)
LYMPHOCYTE#: 1.2 X10E3 (ref 1–3.5)
LYMPHOCYTE%: 13.3 % (ref 17–45)
MEAN CELL VOLUME: 101.4 FL (ref 83–96)
MEAN CORPUSCULAR HEMOGLOBIN: 31.7 PG (ref 28–34)
MEAN PLATELET VOLUME: 9.2 FL (ref 6.5–11.5)
MONOCYTE#: 0.6 X10E3 (ref 0–1)
MONOCYTE%: 6.5 % (ref 3–12)
NEUTROPHIL#: 6.3 X10E3 (ref 1.5–7.1)
NEUTROPHIL%: 72.2 % (ref 40–75)
PLATELET COUNT: 172 X10E3 (ref 140–420)
POTASSIUM: 3.1 MMOL/L (ref 3.5–5.1)
RED BLOOD COUNT: 2.73 X10E (ref 3.9–5.3)
SODIUM: 142 MMOL/L (ref 135–145)
WHITE BLOOD COUNT: 8.7 X10E3 (ref 4–10.5)

## 2017-07-01 LAB
BAND: 2 % (ref 0–10)
BASOPHIL#: 0 X10E3 (ref 0–0.3)
BASOPHIL%: 0.3 % (ref 0–2.5)
BLOOD UREA NITROGEN: 18 MG/DL (ref 9–23)
BUN/CREATININE RATIO: 30
CALCIUM SERUM: 7.8 MG/DL (ref 8.4–10.2)
CK MB SERPL-RTO: 15 % (ref 11–15.5)
CK MB SERPL-RTO: 31.3 G/DL (ref 30–36)
CREATININE SERUM: 0.6 MG/DL (ref 0.6–1.4)
DIFF IND: YES
EOSINOPHIL#: 0.6 X10E3 (ref 0–0.7)
EOSINOPHIL%: 7.5 % (ref 0–7)
EOSINOPHIL: 10 % (ref 0–7)
GLOM FILT RATE ESTIMATED: 104.1 ML/MIN (ref 60–?)
GLUCOSE FASTING: 114 MG/DL (ref 70–110)
HEMATOCRIT: 26.2 % (ref 35–45)
HEMOGLOBIN: 8.2 GM/DL (ref 12–16)
KETONES UR QL: 121 MMOL/L (ref 100–111)
KETONES UR QL: 21 MMOL/L (ref 22–31)
LYMPHOCYTE#: 1.3 X10E3 (ref 1–3.5)
LYMPHOCYTE%: 17.7 % (ref 17–45)
LYMPHOCYTE: 16 % (ref 17–50)
MAGNESIUM: 1.9 MG/DL (ref 1.6–3)
MEAN CELL VOLUME: 101 FL (ref 83–96)
MEAN CORPUSCULAR HEMOGLOBIN: 31.6 PG (ref 28–34)
MEAN PLATELET VOLUME: 9 FL (ref 6.5–11.5)
METAMYELOCYTE: 2 %
MONOCYTE#: 0.7 X10E3 (ref 0–1)
MONOCYTE%: 8.8 % (ref 3–12)
MONOCYTE: 5 % (ref 3–12)
MYELOCYTE: 2 %
NEUTROPHIL#: 4.9 X10E3 (ref 1.5–7.1)
NEUTROPHIL%: 65.7 % (ref 40–75)
NEUTROPHIL: 63 % (ref 40–75)
PLATELET COUNT: 155 X10E3 (ref 140–420)
PLATELET ESTIMATE: NORMAL
POTASSIUM: 4 MMOL/L (ref 3.5–5.1)
RBC NORMAL: YES
RED BLOOD COUNT: 2.59 X10E (ref 3.9–5.3)
SODIUM: 144 MMOL/L (ref 135–145)
WHITE BLOOD COUNT: 7.5 X10E3 (ref 4–10.5)

## 2017-07-02 LAB
BLOOD UREA NITROGEN: 15 MG/DL (ref 9–23)
BUN/CREATININE RATIO: 37.5
CALCIUM SERUM: 7.5 MG/DL (ref 8.4–10.2)
CK MB SERPL-RTO: 14.9 % (ref 11–15.5)
CK MB SERPL-RTO: 31.4 G/DL (ref 30–36)
CREATININE SERUM: 0.4 MG/DL (ref 0.6–1.4)
GLOM FILT RATE ESTIMATED: 118.9 ML/MIN (ref 60–?)
GLUCOSE FASTING: 118 MG/DL (ref 70–110)
HEMATOCRIT: 26 % (ref 35–45)
HEMOGLOBIN: 8.2 GM/DL (ref 12–16)
KETONES UR QL: 119 MMOL/L (ref 100–111)
KETONES UR QL: 20 MMOL/L (ref 22–31)
MEAN CELL VOLUME: 100.5 FL (ref 83–96)
MEAN CORPUSCULAR HEMOGLOBIN: 31.5 PG (ref 28–34)
MEAN PLATELET VOLUME: 8.5 FL (ref 6.5–11.5)
PLATELET COUNT: 174 X10E3 (ref 140–420)
POTASSIUM: 4.1 MMOL/L (ref 3.5–5.1)
RED BLOOD COUNT: 2.59 X10E (ref 3.9–5.3)
SODIUM: 142 MMOL/L (ref 135–145)
WHITE BLOOD COUNT: 8.4 X10E3 (ref 4–10.5)

## 2017-07-03 LAB
BLOOD UREA NITROGEN: 12 MG/DL (ref 9–23)
BUN/CREATININE RATIO: 30
CALCIUM SERUM: 7.8 MG/DL (ref 8.4–10.2)
CREATININE SERUM: 0.4 MG/DL (ref 0.6–1.4)
GLOM FILT RATE ESTIMATED: 118.9 ML/MIN (ref 60–?)
GLUCOSE FASTING: 121 MG/DL (ref 70–110)
KETONES UR QL: 113 MMOL/L (ref 100–111)
KETONES UR QL: 22 MMOL/L (ref 22–31)
POTASSIUM: 4 MMOL/L (ref 3.5–5.1)
SODIUM: 139 MMOL/L (ref 135–145)

## 2018-02-06 ENCOUNTER — LAB REQUISITION (OUTPATIENT)
Dept: LAB | Facility: HOSPITAL | Age: 78
End: 2018-02-06

## 2018-02-06 DIAGNOSIS — Z00.00 ROUTINE GENERAL MEDICAL EXAMINATION AT A HEALTH CARE FACILITY: ICD-10-CM

## 2018-02-06 LAB — VALPROATE SERPL-MCNC: 33 MCG/ML (ref 50–125)

## 2018-02-06 PROCEDURE — 80164 ASSAY DIPROPYLACETIC ACD TOT: CPT
